# Patient Record
Sex: MALE | Race: BLACK OR AFRICAN AMERICAN
[De-identification: names, ages, dates, MRNs, and addresses within clinical notes are randomized per-mention and may not be internally consistent; named-entity substitution may affect disease eponyms.]

---

## 2018-09-20 ENCOUNTER — HOSPITAL ENCOUNTER (INPATIENT)
Dept: HOSPITAL 74 - JASU-ENDO | Age: 43
LOS: 6 days | Discharge: HOME | DRG: 330 | End: 2018-09-26
Attending: INTERNAL MEDICINE | Admitting: INTERNAL MEDICINE
Payer: COMMERCIAL

## 2018-09-20 VITALS — BODY MASS INDEX: 35.8 KG/M2

## 2018-09-20 DIAGNOSIS — K76.89: ICD-10-CM

## 2018-09-20 DIAGNOSIS — K42.0: ICD-10-CM

## 2018-09-20 DIAGNOSIS — K62.5: ICD-10-CM

## 2018-09-20 DIAGNOSIS — Z87.891: ICD-10-CM

## 2018-09-20 DIAGNOSIS — E66.9: ICD-10-CM

## 2018-09-20 DIAGNOSIS — F40.240: ICD-10-CM

## 2018-09-20 DIAGNOSIS — I10: ICD-10-CM

## 2018-09-20 DIAGNOSIS — K76.9: ICD-10-CM

## 2018-09-20 DIAGNOSIS — R91.1: ICD-10-CM

## 2018-09-20 DIAGNOSIS — C18.6: Primary | ICD-10-CM

## 2018-09-20 DIAGNOSIS — K57.40: ICD-10-CM

## 2018-09-20 LAB
ALBUMIN SERPL-MCNC: 4 G/DL (ref 3.4–5)
ALP SERPL-CCNC: 69 U/L (ref 45–117)
ALT SERPL-CCNC: 130 U/L (ref 13–61)
ANION GAP SERPL CALC-SCNC: 8 MMOL/L (ref 8–16)
APTT BLD: 32.1 SECONDS (ref 25.2–36.5)
AST SERPL-CCNC: 52 U/L (ref 15–37)
BASOPHILS # BLD: 0.7 % (ref 0–2)
BASOPHILS # BLD: 0.7 % (ref 0–2)
BILIRUB SERPL-MCNC: 0.8 MG/DL (ref 0.2–1)
BUN SERPL-MCNC: 9 MG/DL (ref 7–18)
CALCIUM SERPL-MCNC: 9.7 MG/DL (ref 8.5–10.1)
CHLORIDE SERPL-SCNC: 102 MMOL/L (ref 98–107)
CO2 SERPL-SCNC: 31 MMOL/L (ref 21–32)
CREAT SERPL-MCNC: 0.9 MG/DL (ref 0.55–1.3)
DEPRECATED RDW RBC AUTO: 14.8 % (ref 11.9–15.9)
DEPRECATED RDW RBC AUTO: 15 % (ref 11.9–15.9)
EOSINOPHIL # BLD: 4.9 % (ref 0–4.5)
EOSINOPHIL # BLD: 5.4 % (ref 0–4.5)
GLUCOSE SERPL-MCNC: 92 MG/DL (ref 74–106)
HCT VFR BLD CALC: 45.6 % (ref 35.4–49)
HCT VFR BLD CALC: 46.8 % (ref 35.4–49)
HGB BLD-MCNC: 15.4 GM/DL (ref 11.7–16.9)
HGB BLD-MCNC: 16 GM/DL (ref 11.7–16.9)
INR BLD: 1.09 (ref 0.83–1.09)
LYMPHOCYTES # BLD: 31.7 % (ref 8–40)
LYMPHOCYTES # BLD: 33.9 % (ref 8–40)
MAGNESIUM SERPL-MCNC: 2.2 MG/DL (ref 1.8–2.4)
MCH RBC QN AUTO: 30.5 PG (ref 25.7–33.7)
MCH RBC QN AUTO: 30.5 PG (ref 25.7–33.7)
MCHC RBC AUTO-ENTMCNC: 33.7 G/DL (ref 32–35.9)
MCHC RBC AUTO-ENTMCNC: 34.1 G/DL (ref 32–35.9)
MCV RBC: 89.5 FL (ref 80–96)
MCV RBC: 90.4 FL (ref 80–96)
MONOCYTES # BLD AUTO: 10.5 % (ref 3.8–10.2)
MONOCYTES # BLD AUTO: 8.8 % (ref 3.8–10.2)
NEUTROPHILS # BLD: 49.5 % (ref 42.8–82.8)
NEUTROPHILS # BLD: 53.9 % (ref 42.8–82.8)
PHOSPHATE SERPL-MCNC: 4 MG/DL (ref 2.5–4.9)
PLATELET # BLD AUTO: 243 K/MM3 (ref 134–434)
PLATELET # BLD AUTO: 249 K/MM3 (ref 134–434)
PMV BLD: 8.5 FL (ref 7.5–11.1)
PMV BLD: 9.1 FL (ref 7.5–11.1)
POTASSIUM SERPLBLD-SCNC: 3.9 MMOL/L (ref 3.5–5.1)
PROT SERPL-MCNC: 8 G/DL (ref 6.4–8.2)
PT PNL PPP: 12.3 SEC (ref 9.7–13)
RBC # BLD AUTO: 5.04 M/MM3 (ref 4–5.6)
RBC # BLD AUTO: 5.23 M/MM3 (ref 4–5.6)
SODIUM SERPL-SCNC: 141 MMOL/L (ref 136–145)
WBC # BLD AUTO: 6.5 K/MM3 (ref 4–10)
WBC # BLD AUTO: 7.4 K/MM3 (ref 4–10)

## 2018-09-20 PROCEDURE — 0DBN8ZX EXCISION OF SIGMOID COLON, VIA NATURAL OR ARTIFICIAL OPENING ENDOSCOPIC, DIAGNOSTIC: ICD-10-PCS | Performed by: INTERNAL MEDICINE

## 2018-09-20 PROCEDURE — 0DBC8ZX EXCISION OF ILEOCECAL VALVE, VIA NATURAL OR ARTIFICIAL OPENING ENDOSCOPIC, DIAGNOSTIC: ICD-10-PCS | Performed by: INTERNAL MEDICINE

## 2018-09-20 PROCEDURE — 0DBM8ZX EXCISION OF DESCENDING COLON, VIA NATURAL OR ARTIFICIAL OPENING ENDOSCOPIC, DIAGNOSTIC: ICD-10-PCS | Performed by: INTERNAL MEDICINE

## 2018-09-20 RX ADMIN — HEPARIN SODIUM SCH: 5000 INJECTION, SOLUTION INTRAVENOUS; SUBCUTANEOUS at 21:49

## 2018-09-21 LAB
ALBUMIN SERPL-MCNC: 3.1 G/DL (ref 3.4–5)
ALBUMIN SERPL-MCNC: 3.5 G/DL (ref 3.4–5)
ALP SERPL-CCNC: 55 U/L (ref 45–117)
ALP SERPL-CCNC: 61 U/L (ref 45–117)
ALT SERPL-CCNC: 113 U/L (ref 13–61)
ALT SERPL-CCNC: 116 U/L (ref 13–61)
ANION GAP SERPL CALC-SCNC: 7 MMOL/L (ref 8–16)
ANION GAP SERPL CALC-SCNC: 9 MMOL/L (ref 8–16)
AST SERPL-CCNC: 52 U/L (ref 15–37)
AST SERPL-CCNC: 52 U/L (ref 15–37)
BASOPHILS # BLD: 0.1 % (ref 0–2)
BASOPHILS # BLD: 0.6 % (ref 0–2)
BILIRUB CONJ SERPL-MCNC: 0.2 MG/DL (ref 0–0.2)
BILIRUB SERPL-MCNC: 0.9 MG/DL (ref 0.2–1)
BILIRUB SERPL-MCNC: 1.1 MG/DL (ref 0.2–1)
BUN SERPL-MCNC: 13 MG/DL (ref 7–18)
BUN SERPL-MCNC: 9 MG/DL (ref 7–18)
CALCIUM SERPL-MCNC: 8.7 MG/DL (ref 8.5–10.1)
CALCIUM SERPL-MCNC: 9.3 MG/DL (ref 8.5–10.1)
CHLORIDE SERPL-SCNC: 105 MMOL/L (ref 98–107)
CHLORIDE SERPL-SCNC: 105 MMOL/L (ref 98–107)
CO2 SERPL-SCNC: 29 MMOL/L (ref 21–32)
CO2 SERPL-SCNC: 29 MMOL/L (ref 21–32)
CREAT SERPL-MCNC: 0.9 MG/DL (ref 0.55–1.3)
CREAT SERPL-MCNC: 1.4 MG/DL (ref 0.55–1.3)
DEPRECATED RDW RBC AUTO: 14.5 % (ref 11.9–15.9)
DEPRECATED RDW RBC AUTO: 14.9 % (ref 11.9–15.9)
EOSINOPHIL # BLD: 0.1 % (ref 0–4.5)
EOSINOPHIL # BLD: 6.2 % (ref 0–4.5)
GLUCOSE SERPL-MCNC: 119 MG/DL (ref 74–106)
GLUCOSE SERPL-MCNC: 86 MG/DL (ref 74–106)
HCT VFR BLD CALC: 42.6 % (ref 35.4–49)
HCT VFR BLD CALC: 46.8 % (ref 35.4–49)
HGB BLD-MCNC: 14.5 GM/DL (ref 11.7–16.9)
HGB BLD-MCNC: 15.8 GM/DL (ref 11.7–16.9)
INR BLD: 1.16 (ref 0.83–1.09)
LYMPHOCYTES # BLD: 31.3 % (ref 8–40)
LYMPHOCYTES # BLD: 5.7 % (ref 8–40)
MCH RBC QN AUTO: 30.3 PG (ref 25.7–33.7)
MCH RBC QN AUTO: 30.6 PG (ref 25.7–33.7)
MCHC RBC AUTO-ENTMCNC: 33.8 G/DL (ref 32–35.9)
MCHC RBC AUTO-ENTMCNC: 34 G/DL (ref 32–35.9)
MCV RBC: 89.1 FL (ref 80–96)
MCV RBC: 90.5 FL (ref 80–96)
MONOCYTES # BLD AUTO: 11.3 % (ref 3.8–10.2)
MONOCYTES # BLD AUTO: 6 % (ref 3.8–10.2)
NEUTROPHILS # BLD: 50.6 % (ref 42.8–82.8)
NEUTROPHILS # BLD: 88.1 % (ref 42.8–82.8)
PLATELET # BLD AUTO: 207 K/MM3 (ref 134–434)
PLATELET # BLD AUTO: 237 K/MM3 (ref 134–434)
PMV BLD: 8.5 FL (ref 7.5–11.1)
PMV BLD: 9.2 FL (ref 7.5–11.1)
POTASSIUM SERPLBLD-SCNC: 3.8 MMOL/L (ref 3.5–5.1)
POTASSIUM SERPLBLD-SCNC: 4.7 MMOL/L (ref 3.5–5.1)
PROT SERPL-MCNC: 6.5 G/DL (ref 6.4–8.2)
PROT SERPL-MCNC: 7.1 G/DL (ref 6.4–8.2)
PT PNL PPP: 13.1 SEC (ref 9.7–13)
RBC # BLD AUTO: 4.78 M/MM3 (ref 4–5.6)
RBC # BLD AUTO: 5.18 M/MM3 (ref 4–5.6)
SODIUM SERPL-SCNC: 141 MMOL/L (ref 136–145)
SODIUM SERPL-SCNC: 143 MMOL/L (ref 136–145)
WBC # BLD AUTO: 10.8 K/MM3 (ref 4–10)
WBC # BLD AUTO: 6.5 K/MM3 (ref 4–10)

## 2018-09-21 PROCEDURE — 0DTG0ZZ RESECTION OF LEFT LARGE INTESTINE, OPEN APPROACH: ICD-10-PCS

## 2018-09-21 PROCEDURE — 0WQF0ZZ REPAIR ABDOMINAL WALL, OPEN APPROACH: ICD-10-PCS

## 2018-09-21 RX ADMIN — HEPARIN SODIUM SCH: 5000 INJECTION, SOLUTION INTRAVENOUS; SUBCUTANEOUS at 06:41

## 2018-09-21 RX ADMIN — MUPIROCIN SCH APPLIC: 20 OINTMENT TOPICAL at 22:46

## 2018-09-21 RX ADMIN — CHLORHEXIDINE GLUCONATE SCH APPLIC: 213 SOLUTION TOPICAL at 22:44

## 2018-09-21 RX ADMIN — NEOMYCIN SULFATE SCH MG: 500 TABLET ORAL at 10:29

## 2018-09-21 RX ADMIN — HYDROMORPHONE HYDROCHLORIDE SCH MG: 10 INJECTION, SOLUTION INTRAMUSCULAR; INTRAVENOUS; SUBCUTANEOUS at 19:20

## 2018-09-21 RX ADMIN — NEOMYCIN SULFATE SCH MG: 500 TABLET ORAL at 06:40

## 2018-09-21 RX ADMIN — NEOMYCIN SULFATE SCH MG: 500 TABLET ORAL at 05:46

## 2018-09-21 RX ADMIN — HEPARIN SODIUM SCH UNIT: 5000 INJECTION, SOLUTION INTRAVENOUS; SUBCUTANEOUS at 22:43

## 2018-09-22 LAB
ALBUMIN SERPL-MCNC: 3.3 G/DL (ref 3.4–5)
ALP SERPL-CCNC: 53 U/L (ref 45–117)
ALT SERPL-CCNC: 101 U/L (ref 13–61)
ANION GAP SERPL CALC-SCNC: 8 MMOL/L (ref 8–16)
AST SERPL-CCNC: 45 U/L (ref 15–37)
BILIRUB SERPL-MCNC: 1.3 MG/DL (ref 0.2–1)
BUN SERPL-MCNC: 16 MG/DL (ref 7–18)
CALCIUM SERPL-MCNC: 8.9 MG/DL (ref 8.5–10.1)
CHLORIDE SERPL-SCNC: 102 MMOL/L (ref 98–107)
CO2 SERPL-SCNC: 29 MMOL/L (ref 21–32)
CREAT SERPL-MCNC: 1.1 MG/DL (ref 0.55–1.3)
DEPRECATED RDW RBC AUTO: 14.6 % (ref 11.9–15.9)
GLUCOSE SERPL-MCNC: 133 MG/DL (ref 74–106)
HCT VFR BLD CALC: 44.4 % (ref 35.4–49)
HGB BLD-MCNC: 14.8 GM/DL (ref 11.7–16.9)
MAGNESIUM SERPL-MCNC: 2.2 MG/DL (ref 1.8–2.4)
MCH RBC QN AUTO: 29.9 PG (ref 25.7–33.7)
MCHC RBC AUTO-ENTMCNC: 33.3 G/DL (ref 32–35.9)
MCV RBC: 89.7 FL (ref 80–96)
PHOSPHATE SERPL-MCNC: 4.5 MG/DL (ref 2.5–4.9)
PLATELET # BLD AUTO: 222 K/MM3 (ref 134–434)
PMV BLD: 9 FL (ref 7.5–11.1)
POTASSIUM SERPLBLD-SCNC: 4.8 MMOL/L (ref 3.5–5.1)
PROT SERPL-MCNC: 6.4 G/DL (ref 6.4–8.2)
RBC # BLD AUTO: 4.94 M/MM3 (ref 4–5.6)
SODIUM SERPL-SCNC: 139 MMOL/L (ref 136–145)
WBC # BLD AUTO: 12.7 K/MM3 (ref 4–10)

## 2018-09-22 RX ADMIN — PANTOPRAZOLE SODIUM SCH MG: 40 INJECTION, POWDER, FOR SOLUTION INTRAVENOUS at 10:19

## 2018-09-22 RX ADMIN — HEPARIN SODIUM SCH UNIT: 5000 INJECTION, SOLUTION INTRAVENOUS; SUBCUTANEOUS at 23:00

## 2018-09-22 RX ADMIN — HYDROMORPHONE HYDROCHLORIDE SCH MG: 10 INJECTION, SOLUTION INTRAMUSCULAR; INTRAVENOUS; SUBCUTANEOUS at 12:08

## 2018-09-22 RX ADMIN — CHLORHEXIDINE GLUCONATE SCH APPLIC: 213 SOLUTION TOPICAL at 22:30

## 2018-09-22 RX ADMIN — HYDROMORPHONE HYDROCHLORIDE SCH MG: 10 INJECTION, SOLUTION INTRAMUSCULAR; INTRAVENOUS; SUBCUTANEOUS at 22:00

## 2018-09-22 RX ADMIN — HEPARIN SODIUM SCH UNIT: 5000 INJECTION, SOLUTION INTRAVENOUS; SUBCUTANEOUS at 06:27

## 2018-09-22 RX ADMIN — MUPIROCIN SCH APPLIC: 20 OINTMENT TOPICAL at 11:16

## 2018-09-22 RX ADMIN — HEPARIN SODIUM SCH UNIT: 5000 INJECTION, SOLUTION INTRAVENOUS; SUBCUTANEOUS at 14:41

## 2018-09-23 LAB
ALBUMIN SERPL-MCNC: 2.9 G/DL (ref 3.4–5)
ALP SERPL-CCNC: 50 U/L (ref 45–117)
ALT SERPL-CCNC: 71 U/L (ref 13–61)
ANION GAP SERPL CALC-SCNC: 3 MMOL/L (ref 8–16)
AST SERPL-CCNC: 40 U/L (ref 15–37)
BILIRUB SERPL-MCNC: 1 MG/DL (ref 0.2–1)
BUN SERPL-MCNC: 16 MG/DL (ref 7–18)
CALCIUM SERPL-MCNC: 8.3 MG/DL (ref 8.5–10.1)
CHLORIDE SERPL-SCNC: 102 MMOL/L (ref 98–107)
CO2 SERPL-SCNC: 33 MMOL/L (ref 21–32)
CREAT SERPL-MCNC: 1.2 MG/DL (ref 0.55–1.3)
DEPRECATED RDW RBC AUTO: 14.7 % (ref 11.9–15.9)
GLUCOSE SERPL-MCNC: 76 MG/DL (ref 74–106)
HCT VFR BLD CALC: 39 % (ref 35.4–49)
HGB BLD-MCNC: 13.5 GM/DL (ref 11.7–16.9)
MAGNESIUM SERPL-MCNC: 2.1 MG/DL (ref 1.8–2.4)
MCH RBC QN AUTO: 31.3 PG (ref 25.7–33.7)
MCHC RBC AUTO-ENTMCNC: 34.6 G/DL (ref 32–35.9)
MCV RBC: 90.3 FL (ref 80–96)
PHOSPHATE SERPL-MCNC: 2.8 MG/DL (ref 2.5–4.9)
PLATELET # BLD AUTO: 194 K/MM3 (ref 134–434)
PMV BLD: 8.6 FL (ref 7.5–11.1)
POTASSIUM SERPLBLD-SCNC: 4.2 MMOL/L (ref 3.5–5.1)
PROT SERPL-MCNC: 6.3 G/DL (ref 6.4–8.2)
RBC # BLD AUTO: 4.32 M/MM3 (ref 4–5.6)
SODIUM SERPL-SCNC: 139 MMOL/L (ref 136–145)
WBC # BLD AUTO: 10.6 K/MM3 (ref 4–10)

## 2018-09-23 RX ADMIN — PANTOPRAZOLE SODIUM SCH MG: 40 INJECTION, POWDER, FOR SOLUTION INTRAVENOUS at 09:43

## 2018-09-23 RX ADMIN — MUPIROCIN SCH APPLIC: 20 OINTMENT TOPICAL at 09:43

## 2018-09-23 RX ADMIN — MORPHINE SULFATE PRN MG: 4 INJECTION, SOLUTION INTRAMUSCULAR; INTRAVENOUS at 20:02

## 2018-09-23 RX ADMIN — HEPARIN SODIUM SCH UNIT: 5000 INJECTION, SOLUTION INTRAVENOUS; SUBCUTANEOUS at 13:50

## 2018-09-23 RX ADMIN — MORPHINE SULFATE PRN MG: 4 INJECTION, SOLUTION INTRAMUSCULAR; INTRAVENOUS at 17:01

## 2018-09-23 RX ADMIN — HEPARIN SODIUM SCH UNIT: 5000 INJECTION, SOLUTION INTRAVENOUS; SUBCUTANEOUS at 05:55

## 2018-09-24 LAB
ALBUMIN SERPL-MCNC: 2.8 G/DL (ref 3.4–5)
ALP SERPL-CCNC: 46 U/L (ref 45–117)
ALT SERPL-CCNC: 57 U/L (ref 13–61)
ANION GAP SERPL CALC-SCNC: 7 MMOL/L (ref 8–16)
AST SERPL-CCNC: 31 U/L (ref 15–37)
BASOPHILS # BLD: 0.5 % (ref 0–2)
BILIRUB SERPL-MCNC: 1.2 MG/DL (ref 0.2–1)
BUN SERPL-MCNC: 15 MG/DL (ref 7–18)
CALCIUM SERPL-MCNC: 8.3 MG/DL (ref 8.5–10.1)
CHLORIDE SERPL-SCNC: 102 MMOL/L (ref 98–107)
CO2 SERPL-SCNC: 29 MMOL/L (ref 21–32)
CREAT SERPL-MCNC: 1.1 MG/DL (ref 0.55–1.3)
DEPRECATED RDW RBC AUTO: 14.3 % (ref 11.9–15.9)
EOSINOPHIL # BLD: 2.6 % (ref 0–4.5)
GLUCOSE SERPL-MCNC: 92 MG/DL (ref 74–106)
HCT VFR BLD CALC: 35.2 % (ref 35.4–49)
HGB BLD-MCNC: 12.2 GM/DL (ref 11.7–16.9)
LYMPHOCYTES # BLD: 18.9 % (ref 8–40)
MAGNESIUM SERPL-MCNC: 1.9 MG/DL (ref 1.8–2.4)
MCH RBC QN AUTO: 30.9 PG (ref 25.7–33.7)
MCHC RBC AUTO-ENTMCNC: 34.7 G/DL (ref 32–35.9)
MCV RBC: 89 FL (ref 80–96)
MONOCYTES # BLD AUTO: 12.6 % (ref 3.8–10.2)
NEUTROPHILS # BLD: 65.4 % (ref 42.8–82.8)
PHOSPHATE SERPL-MCNC: 4.2 MG/DL (ref 2.5–4.9)
PLATELET # BLD AUTO: 200 K/MM3 (ref 134–434)
PMV BLD: 8.2 FL (ref 7.5–11.1)
POTASSIUM SERPLBLD-SCNC: 4 MMOL/L (ref 3.5–5.1)
PROT SERPL-MCNC: 6.2 G/DL (ref 6.4–8.2)
RBC # BLD AUTO: 3.96 M/MM3 (ref 4–5.6)
SODIUM SERPL-SCNC: 138 MMOL/L (ref 136–145)
WBC # BLD AUTO: 10.7 K/MM3 (ref 4–10)

## 2018-09-24 RX ADMIN — MUPIROCIN SCH APPLIC: 20 OINTMENT TOPICAL at 09:36

## 2018-09-24 RX ADMIN — HEPARIN SODIUM SCH UNIT: 5000 INJECTION, SOLUTION INTRAVENOUS; SUBCUTANEOUS at 13:05

## 2018-09-24 RX ADMIN — MUPIROCIN SCH: 20 OINTMENT TOPICAL at 00:00

## 2018-09-24 RX ADMIN — HEPARIN SODIUM SCH UNIT: 5000 INJECTION, SOLUTION INTRAVENOUS; SUBCUTANEOUS at 21:11

## 2018-09-24 RX ADMIN — PANTOPRAZOLE SODIUM SCH MG: 40 INJECTION, POWDER, FOR SOLUTION INTRAVENOUS at 09:34

## 2018-09-24 RX ADMIN — HEPARIN SODIUM SCH UNIT: 5000 INJECTION, SOLUTION INTRAVENOUS; SUBCUTANEOUS at 06:07

## 2018-09-24 RX ADMIN — MORPHINE SULFATE PRN MG: 4 INJECTION, SOLUTION INTRAMUSCULAR; INTRAVENOUS at 15:23

## 2018-09-24 RX ADMIN — HEPARIN SODIUM SCH: 5000 INJECTION, SOLUTION INTRAVENOUS; SUBCUTANEOUS at 00:00

## 2018-09-24 RX ADMIN — MUPIROCIN SCH: 20 OINTMENT TOPICAL at 18:54

## 2018-09-24 RX ADMIN — HEPARIN SODIUM SCH: 5000 INJECTION, SOLUTION INTRAVENOUS; SUBCUTANEOUS at 18:53

## 2018-09-25 LAB
ANION GAP SERPL CALC-SCNC: 10 MMOL/L (ref 8–16)
BUN SERPL-MCNC: 15 MG/DL (ref 7–18)
CALCIUM SERPL-MCNC: 8.3 MG/DL (ref 8.5–10.1)
CHLORIDE SERPL-SCNC: 105 MMOL/L (ref 98–107)
CO2 SERPL-SCNC: 29 MMOL/L (ref 21–32)
CREAT SERPL-MCNC: 1.1 MG/DL (ref 0.55–1.3)
DEPRECATED RDW RBC AUTO: 14.7 % (ref 11.9–15.9)
GLUCOSE SERPL-MCNC: 88 MG/DL (ref 74–106)
HCT VFR BLD CALC: 30.8 % (ref 35.4–49)
HGB BLD-MCNC: 10.6 GM/DL (ref 11.7–16.9)
MAGNESIUM SERPL-MCNC: 2.1 MG/DL (ref 1.8–2.4)
MCH RBC QN AUTO: 30.5 PG (ref 25.7–33.7)
MCHC RBC AUTO-ENTMCNC: 34.4 G/DL (ref 32–35.9)
MCV RBC: 88.6 FL (ref 80–96)
PHOSPHATE SERPL-MCNC: 3.7 MG/DL (ref 2.5–4.9)
PLATELET # BLD AUTO: 224 K/MM3 (ref 134–434)
PMV BLD: 8.1 FL (ref 7.5–11.1)
POTASSIUM SERPLBLD-SCNC: 3.9 MMOL/L (ref 3.5–5.1)
RBC # BLD AUTO: 3.47 M/MM3 (ref 4–5.6)
SODIUM SERPL-SCNC: 143 MMOL/L (ref 136–145)
WBC # BLD AUTO: 11.2 K/MM3 (ref 4–10)

## 2018-09-25 RX ADMIN — HEPARIN SODIUM SCH UNIT: 5000 INJECTION, SOLUTION INTRAVENOUS; SUBCUTANEOUS at 06:01

## 2018-09-25 RX ADMIN — HEPARIN SODIUM SCH UNIT: 5000 INJECTION, SOLUTION INTRAVENOUS; SUBCUTANEOUS at 21:11

## 2018-09-25 RX ADMIN — PANTOPRAZOLE SODIUM SCH MG: 40 INJECTION, POWDER, FOR SOLUTION INTRAVENOUS at 11:02

## 2018-09-25 RX ADMIN — HEPARIN SODIUM SCH UNIT: 5000 INJECTION, SOLUTION INTRAVENOUS; SUBCUTANEOUS at 14:31

## 2018-09-26 VITALS — DIASTOLIC BLOOD PRESSURE: 77 MMHG | SYSTOLIC BLOOD PRESSURE: 130 MMHG | HEART RATE: 93 BPM | TEMPERATURE: 98.5 F

## 2018-09-26 LAB
BASOPHILS # BLD: 1 % (ref 0–2)
DEPRECATED RDW RBC AUTO: 14.5 % (ref 11.9–15.9)
EOSINOPHIL # BLD: 7.4 % (ref 0–4.5)
HCT VFR BLD CALC: 31.2 % (ref 35.4–49)
HGB BLD-MCNC: 10.6 GM/DL (ref 11.7–16.9)
LYMPHOCYTES # BLD: 20.7 % (ref 8–40)
MCH RBC QN AUTO: 30.7 PG (ref 25.7–33.7)
MCHC RBC AUTO-ENTMCNC: 34 G/DL (ref 32–35.9)
MCV RBC: 90.1 FL (ref 80–96)
MONOCYTES # BLD AUTO: 11.5 % (ref 3.8–10.2)
NEUTROPHILS # BLD: 59.4 % (ref 42.8–82.8)
PLATELET # BLD AUTO: 256 K/MM3 (ref 134–434)
PMV BLD: 8.2 FL (ref 7.5–11.1)
RBC # BLD AUTO: 3.46 M/MM3 (ref 4–5.6)
WBC # BLD AUTO: 10.2 K/MM3 (ref 4–10)

## 2018-09-26 RX ADMIN — HEPARIN SODIUM SCH UNIT: 5000 INJECTION, SOLUTION INTRAVENOUS; SUBCUTANEOUS at 06:22

## 2018-09-26 RX ADMIN — PANTOPRAZOLE SODIUM SCH MG: 40 INJECTION, POWDER, FOR SOLUTION INTRAVENOUS at 11:19

## 2018-10-11 ENCOUNTER — HOSPITAL ENCOUNTER (EMERGENCY)
Dept: HOSPITAL 74 - JERFT | Age: 43
Discharge: HOME | End: 2018-10-11
Payer: COMMERCIAL

## 2018-10-11 VITALS — SYSTOLIC BLOOD PRESSURE: 125 MMHG | HEART RATE: 84 BPM | DIASTOLIC BLOOD PRESSURE: 78 MMHG | TEMPERATURE: 99.3 F

## 2018-10-11 VITALS — BODY MASS INDEX: 28.7 KG/M2

## 2018-10-11 DIAGNOSIS — Z48.01: Primary | ICD-10-CM

## 2019-02-15 ENCOUNTER — HOSPITAL ENCOUNTER (OUTPATIENT)
Dept: HOSPITAL 74 - JONCCHEMO | Age: 44
Discharge: HOME | End: 2019-02-15
Attending: INTERNAL MEDICINE
Payer: COMMERCIAL

## 2019-02-15 VITALS — TEMPERATURE: 98.4 F

## 2019-02-15 VITALS — DIASTOLIC BLOOD PRESSURE: 95 MMHG | SYSTOLIC BLOOD PRESSURE: 145 MMHG | HEART RATE: 90 BPM

## 2019-02-15 DIAGNOSIS — C18.9: ICD-10-CM

## 2019-02-15 DIAGNOSIS — Z51.11: Primary | ICD-10-CM

## 2019-02-15 LAB
ALBUMIN SERPL-MCNC: 3.8 G/DL (ref 3.4–5)
ALBUMIN SERPL-MCNC: 3.8 G/DL (ref 3.4–5)
ALP SERPL-CCNC: 60 U/L (ref 45–117)
ALP SERPL-CCNC: 61 U/L (ref 45–117)
ALT SERPL-CCNC: 75 U/L (ref 13–61)
ALT SERPL-CCNC: 78 U/L (ref 13–61)
ANION GAP SERPL CALC-SCNC: 5 MMOL/L (ref 8–16)
AST SERPL-CCNC: 36 U/L (ref 15–37)
AST SERPL-CCNC: 37 U/L (ref 15–37)
BASOPHILS # BLD: 0.5 % (ref 0–2)
BILIRUB CONJ SERPL-MCNC: 0.1 MG/DL (ref 0–0.2)
BILIRUB SERPL-MCNC: 0.5 MG/DL (ref 0.2–1)
BILIRUB SERPL-MCNC: 0.5 MG/DL (ref 0.2–1)
BUN SERPL-MCNC: 12 MG/DL (ref 7–18)
CALCIUM SERPL-MCNC: 9 MG/DL (ref 8.5–10.1)
CHLORIDE SERPL-SCNC: 106 MMOL/L (ref 98–107)
CO2 SERPL-SCNC: 29 MMOL/L (ref 21–32)
CREAT SERPL-MCNC: 0.9 MG/DL (ref 0.55–1.3)
DEPRECATED RDW RBC AUTO: 16.3 % (ref 11.9–15.9)
EOSINOPHIL # BLD: 3.9 % (ref 0–4.5)
GLUCOSE SERPL-MCNC: 105 MG/DL (ref 74–106)
HCT VFR BLD CALC: 46.4 % (ref 35.4–49)
HGB BLD-MCNC: 15.9 GM/DL (ref 11.7–16.9)
LYMPHOCYTES # BLD: 32.7 % (ref 8–40)
MAGNESIUM SERPL-MCNC: 2.3 MG/DL (ref 1.8–2.4)
MCH RBC QN AUTO: 29.4 PG (ref 25.7–33.7)
MCHC RBC AUTO-ENTMCNC: 34.2 G/DL (ref 32–35.9)
MCV RBC: 85.9 FL (ref 80–96)
MONOCYTES # BLD AUTO: 10.2 % (ref 3.8–10.2)
NEUTROPHILS # BLD: 52.7 % (ref 42.8–82.8)
PLATELET # BLD AUTO: 276 K/MM3 (ref 134–434)
PMV BLD: 8.4 FL (ref 7.5–11.1)
POTASSIUM SERPLBLD-SCNC: 3.8 MMOL/L (ref 3.5–5.1)
PROT SERPL-MCNC: 7.8 G/DL (ref 6.4–8.2)
PROT SERPL-MCNC: 7.9 G/DL (ref 6.4–8.2)
RBC # BLD AUTO: 5.41 M/MM3 (ref 4–5.6)
SODIUM SERPL-SCNC: 140 MMOL/L (ref 136–145)
WBC # BLD AUTO: 5 K/MM3 (ref 4–10)

## 2019-03-08 ENCOUNTER — HOSPITAL ENCOUNTER (OUTPATIENT)
Dept: HOSPITAL 74 - JONCCHEMO | Age: 44
Discharge: HOME | End: 2019-03-08
Attending: INTERNAL MEDICINE
Payer: COMMERCIAL

## 2019-03-08 VITALS — DIASTOLIC BLOOD PRESSURE: 95 MMHG | HEART RATE: 84 BPM | SYSTOLIC BLOOD PRESSURE: 146 MMHG | TEMPERATURE: 97.7 F

## 2019-03-08 DIAGNOSIS — Z76.89: ICD-10-CM

## 2019-03-08 DIAGNOSIS — C18.9: Primary | ICD-10-CM

## 2019-03-08 LAB
ALBUMIN SERPL-MCNC: 3.7 G/DL (ref 3.4–5)
ALP SERPL-CCNC: 63 U/L (ref 45–117)
ALT SERPL-CCNC: 89 U/L (ref 13–61)
ANION GAP SERPL CALC-SCNC: 4 MMOL/L (ref 8–16)
AST SERPL-CCNC: 13 U/L (ref 15–37)
BASOPHILS # BLD: 1 % (ref 0–2)
BILIRUB CONJ SERPL-MCNC: 0.1 MG/DL (ref 0–0.2)
BILIRUB SERPL-MCNC: 0.6 MG/DL (ref 0.2–1)
BUN SERPL-MCNC: 12 MG/DL (ref 7–18)
CALCIUM SERPL-MCNC: 8.7 MG/DL (ref 8.5–10.1)
CHLORIDE SERPL-SCNC: 107 MMOL/L (ref 98–107)
CO2 SERPL-SCNC: 28 MMOL/L (ref 21–32)
CREAT SERPL-MCNC: 1.1 MG/DL (ref 0.55–1.3)
DEPRECATED RDW RBC AUTO: 17.1 % (ref 11.9–15.9)
EOSINOPHIL # BLD: 2.7 % (ref 0–4.5)
GLUCOSE SERPL-MCNC: 95 MG/DL (ref 74–106)
HCT VFR BLD CALC: 44 % (ref 35.4–49)
HGB BLD-MCNC: 15.2 GM/DL (ref 11.7–16.9)
LYMPHOCYTES # BLD: 36 % (ref 8–40)
MAGNESIUM SERPL-MCNC: 2.3 MG/DL (ref 1.8–2.4)
MCH RBC QN AUTO: 30 PG (ref 25.7–33.7)
MCHC RBC AUTO-ENTMCNC: 34.5 G/DL (ref 32–35.9)
MCV RBC: 86.7 FL (ref 80–96)
MONOCYTES # BLD AUTO: 14.1 % (ref 3.8–10.2)
NEUTROPHILS # BLD: 46.2 % (ref 42.8–82.8)
PLATELET # BLD AUTO: 260 K/MM3 (ref 134–434)
PMV BLD: 8.1 FL (ref 7.5–11.1)
POTASSIUM SERPLBLD-SCNC: 3.8 MMOL/L (ref 3.5–5.1)
PROT SERPL-MCNC: 7.6 G/DL (ref 6.4–8.2)
RBC # BLD AUTO: 5.07 M/MM3 (ref 4–5.6)
SODIUM SERPL-SCNC: 139 MMOL/L (ref 136–145)
WBC # BLD AUTO: 6 K/MM3 (ref 4–10)

## 2019-03-08 PROCEDURE — 3E033GC INTRODUCTION OF OTHER THERAPEUTIC SUBSTANCE INTO PERIPHERAL VEIN, PERCUTANEOUS APPROACH: ICD-10-PCS | Performed by: INTERNAL MEDICINE

## 2019-03-29 ENCOUNTER — HOSPITAL ENCOUNTER (OUTPATIENT)
Dept: HOSPITAL 74 - JONCCHEMO | Age: 44
Discharge: HOME | End: 2019-03-29
Attending: INTERNAL MEDICINE
Payer: COMMERCIAL

## 2019-03-29 VITALS — TEMPERATURE: 98.2 F

## 2019-03-29 VITALS — DIASTOLIC BLOOD PRESSURE: 85 MMHG | SYSTOLIC BLOOD PRESSURE: 130 MMHG | HEART RATE: 75 BPM

## 2019-03-29 DIAGNOSIS — Z51.11: Primary | ICD-10-CM

## 2019-03-29 DIAGNOSIS — C18.9: ICD-10-CM

## 2019-03-29 LAB
ALBUMIN SERPL-MCNC: 3.9 G/DL (ref 3.4–5)
ALBUMIN SERPL-MCNC: 4 G/DL (ref 3.4–5)
ALP SERPL-CCNC: 58 U/L (ref 45–117)
ALP SERPL-CCNC: 58 U/L (ref 45–117)
ALT SERPL-CCNC: 76 U/L (ref 13–61)
ALT SERPL-CCNC: 77 U/L (ref 13–61)
ANION GAP SERPL CALC-SCNC: 8 MMOL/L (ref 8–16)
AST SERPL-CCNC: 45 U/L (ref 15–37)
AST SERPL-CCNC: 46 U/L (ref 15–37)
BASOPHILS # BLD: 0.6 % (ref 0–2)
BILIRUB CONJ SERPL-MCNC: 0.2 MG/DL (ref 0–0.2)
BILIRUB SERPL-MCNC: 0.6 MG/DL (ref 0.2–1)
BILIRUB SERPL-MCNC: 0.6 MG/DL (ref 0.2–1)
BUN SERPL-MCNC: 13 MG/DL (ref 7–18)
CALCIUM SERPL-MCNC: 8.9 MG/DL (ref 8.5–10.1)
CHLORIDE SERPL-SCNC: 107 MMOL/L (ref 98–107)
CO2 SERPL-SCNC: 27 MMOL/L (ref 21–32)
CREAT SERPL-MCNC: 1 MG/DL (ref 0.55–1.3)
DEPRECATED RDW RBC AUTO: 20.3 % (ref 11.9–15.9)
EOSINOPHIL # BLD: 2.6 % (ref 0–4.5)
GLUCOSE SERPL-MCNC: 87 MG/DL (ref 74–106)
HCT VFR BLD CALC: 43.7 % (ref 35.4–49)
HGB BLD-MCNC: 14.4 GM/DL (ref 11.7–16.9)
LYMPHOCYTES # BLD: 32.2 % (ref 8–40)
MAGNESIUM SERPL-MCNC: 2.1 MG/DL (ref 1.8–2.4)
MCH RBC QN AUTO: 29 PG (ref 25.7–33.7)
MCHC RBC AUTO-ENTMCNC: 32.8 G/DL (ref 32–35.9)
MCV RBC: 88.4 FL (ref 80–96)
MONOCYTES # BLD AUTO: 16.5 % (ref 3.8–10.2)
NEUTROPHILS # BLD: 48.1 % (ref 42.8–82.8)
PLATELET # BLD AUTO: 239 K/MM3 (ref 134–434)
PMV BLD: 8.1 FL (ref 7.5–11.1)
POTASSIUM SERPLBLD-SCNC: 3.8 MMOL/L (ref 3.5–5.1)
PROT SERPL-MCNC: 7.4 G/DL (ref 6.4–8.2)
PROT SERPL-MCNC: 7.5 G/DL (ref 6.4–8.2)
RBC # BLD AUTO: 4.94 M/MM3 (ref 4–5.6)
SODIUM SERPL-SCNC: 143 MMOL/L (ref 136–145)
WBC # BLD AUTO: 5.9 K/MM3 (ref 4–10)

## 2019-03-31 ENCOUNTER — HOSPITAL ENCOUNTER (EMERGENCY)
Dept: HOSPITAL 74 - JER | Age: 44
LOS: 1 days | Discharge: HOME | End: 2019-04-01
Payer: COMMERCIAL

## 2019-03-31 VITALS — HEART RATE: 75 BPM | TEMPERATURE: 98.3 F | SYSTOLIC BLOOD PRESSURE: 145 MMHG | DIASTOLIC BLOOD PRESSURE: 84 MMHG

## 2019-03-31 VITALS — BODY MASS INDEX: 30.8 KG/M2

## 2019-03-31 DIAGNOSIS — Z85.038: ICD-10-CM

## 2019-03-31 DIAGNOSIS — M79.601: Primary | ICD-10-CM

## 2019-03-31 DIAGNOSIS — I10: ICD-10-CM

## 2019-03-31 LAB
ALBUMIN SERPL-MCNC: 3.5 G/DL (ref 3.4–5)
ALP SERPL-CCNC: 53 U/L (ref 45–117)
ALT SERPL-CCNC: 85 U/L (ref 13–61)
ANION GAP SERPL CALC-SCNC: 6 MMOL/L (ref 8–16)
ANISOCYTOSIS BLD QL: (no result)
AST SERPL-CCNC: 53 U/L (ref 15–37)
BASOPHILS # BLD: 0.8 % (ref 0–2)
BILIRUB SERPL-MCNC: 0.4 MG/DL (ref 0.2–1)
BUN SERPL-MCNC: 15 MG/DL (ref 7–18)
CALCIUM SERPL-MCNC: 8.4 MG/DL (ref 8.5–10.1)
CHLORIDE SERPL-SCNC: 108 MMOL/L (ref 98–107)
CO2 SERPL-SCNC: 26 MMOL/L (ref 21–32)
CREAT SERPL-MCNC: 0.9 MG/DL (ref 0.55–1.3)
DEPRECATED RDW RBC AUTO: 22.2 % (ref 11.9–15.9)
EOSINOPHIL # BLD: 1.7 % (ref 0–4.5)
GLUCOSE SERPL-MCNC: 145 MG/DL (ref 74–106)
HCT VFR BLD CALC: 41.8 % (ref 35.4–49)
HGB BLD-MCNC: 14.1 GM/DL (ref 11.7–16.9)
LYMPHOCYTES # BLD: 39.4 % (ref 8–40)
MACROCYTES BLD QL: (no result)
MCH RBC QN AUTO: 30.4 PG (ref 25.7–33.7)
MCHC RBC AUTO-ENTMCNC: 33.8 G/DL (ref 32–35.9)
MCV RBC: 90 FL (ref 80–96)
MONOCYTES # BLD AUTO: 14.6 % (ref 3.8–10.2)
NEUTROPHILS # BLD: 43.5 % (ref 42.8–82.8)
PLATELET # BLD AUTO: 226 K/MM3 (ref 134–434)
PLATELET BLD QL SMEAR: ADEQUATE
PMV BLD: 7.6 FL (ref 7.5–11.1)
POTASSIUM SERPLBLD-SCNC: 3.9 MMOL/L (ref 3.5–5.1)
PROT SERPL-MCNC: 6.9 G/DL (ref 6.4–8.2)
RBC # BLD AUTO: 4.64 M/MM3 (ref 4–5.6)
SODIUM SERPL-SCNC: 140 MMOL/L (ref 136–145)
WBC # BLD AUTO: 5.1 K/MM3 (ref 4–10)

## 2019-04-19 ENCOUNTER — HOSPITAL ENCOUNTER (OUTPATIENT)
Dept: HOSPITAL 74 - JONCCHEMO | Age: 44
Discharge: HOME | End: 2019-04-19
Attending: INTERNAL MEDICINE
Payer: COMMERCIAL

## 2019-04-19 VITALS — TEMPERATURE: 98.2 F | SYSTOLIC BLOOD PRESSURE: 137 MMHG | DIASTOLIC BLOOD PRESSURE: 81 MMHG | HEART RATE: 99 BPM

## 2019-04-19 DIAGNOSIS — C18.9: ICD-10-CM

## 2019-04-19 DIAGNOSIS — Z51.11: Primary | ICD-10-CM

## 2019-04-19 LAB
ALBUMIN SERPL-MCNC: 3.8 G/DL (ref 3.4–5)
ALP SERPL-CCNC: 58 U/L (ref 45–117)
ALT SERPL-CCNC: 140 U/L (ref 13–61)
ANION GAP SERPL CALC-SCNC: 6 MMOL/L (ref 8–16)
ANISOCYTOSIS BLD QL: (no result)
AST SERPL-CCNC: 62 U/L (ref 15–37)
BASOPHILS # BLD: 1 % (ref 0–2)
BILIRUB SERPL-MCNC: 0.5 MG/DL (ref 0.2–1)
BUN SERPL-MCNC: 12 MG/DL (ref 7–18)
CALCIUM SERPL-MCNC: 9.7 MG/DL (ref 8.5–10.1)
CHLORIDE SERPL-SCNC: 106 MMOL/L (ref 98–107)
CO2 SERPL-SCNC: 27 MMOL/L (ref 21–32)
CREAT SERPL-MCNC: 1 MG/DL (ref 0.55–1.3)
DEPRECATED RDW RBC AUTO: 21.4 % (ref 11.9–15.9)
EOSINOPHIL # BLD: 0 % (ref 0–4.5)
GLUCOSE SERPL-MCNC: 153 MG/DL (ref 74–106)
HCT VFR BLD CALC: 43.9 % (ref 35.4–49)
HGB BLD-MCNC: 14.7 GM/DL (ref 11.7–16.9)
LYMPHOCYTES # BLD: 13.6 % (ref 8–40)
MACROCYTES BLD QL: (no result)
MCH RBC QN AUTO: 30.5 PG (ref 25.7–33.7)
MCHC RBC AUTO-ENTMCNC: 33.5 G/DL (ref 32–35.9)
MCV RBC: 91.1 FL (ref 80–96)
MONOCYTES # BLD AUTO: 7.1 % (ref 3.8–10.2)
NEUTROPHILS # BLD: 78.3 % (ref 42.8–82.8)
PLATELET # BLD AUTO: 304 K/MM3 (ref 134–434)
PLATELET BLD QL SMEAR: NORMAL
PMV BLD: 8.2 FL (ref 7.5–11.1)
POTASSIUM SERPLBLD-SCNC: 4.1 MMOL/L (ref 3.5–5.1)
PROT SERPL-MCNC: 8 G/DL (ref 6.4–8.2)
RBC # BLD AUTO: 4.82 M/MM3 (ref 4–5.6)
SODIUM SERPL-SCNC: 139 MMOL/L (ref 136–145)
WBC # BLD AUTO: 12 K/MM3 (ref 4–10)

## 2019-05-10 ENCOUNTER — HOSPITAL ENCOUNTER (OUTPATIENT)
Dept: HOSPITAL 74 - JONCCHEMO | Age: 44
Discharge: HOME | End: 2019-05-10
Payer: COMMERCIAL

## 2019-05-31 ENCOUNTER — HOSPITAL ENCOUNTER (OUTPATIENT)
Dept: HOSPITAL 74 - JONCCHEMO | Age: 44
Discharge: HOME | End: 2019-05-31
Attending: INTERNAL MEDICINE
Payer: COMMERCIAL

## 2019-05-31 VITALS — SYSTOLIC BLOOD PRESSURE: 138 MMHG | DIASTOLIC BLOOD PRESSURE: 94 MMHG | HEART RATE: 90 BPM

## 2019-05-31 VITALS — TEMPERATURE: 97.7 F

## 2019-05-31 DIAGNOSIS — I10: ICD-10-CM

## 2019-05-31 DIAGNOSIS — E66.9: ICD-10-CM

## 2019-05-31 DIAGNOSIS — Z51.11: Primary | ICD-10-CM

## 2019-05-31 DIAGNOSIS — C18.9: ICD-10-CM

## 2019-05-31 LAB
ALBUMIN SERPL-MCNC: 3.8 G/DL (ref 3.4–5)
ALP SERPL-CCNC: 60 U/L (ref 45–117)
ALT SERPL-CCNC: 99 U/L (ref 13–61)
ANION GAP SERPL CALC-SCNC: 6 MMOL/L (ref 8–16)
AST SERPL-CCNC: 54 U/L (ref 15–37)
BASOPHILS # BLD: 1.4 % (ref 0–2)
BILIRUB CONJ SERPL-MCNC: 0.2 MG/DL (ref 0–0.2)
BILIRUB SERPL-MCNC: 0.5 MG/DL (ref 0.2–1)
BUN SERPL-MCNC: 7 MG/DL (ref 7–18)
CALCIUM SERPL-MCNC: 9 MG/DL (ref 8.5–10.1)
CHLORIDE SERPL-SCNC: 108 MMOL/L (ref 98–107)
CO2 SERPL-SCNC: 27 MMOL/L (ref 21–32)
CREAT SERPL-MCNC: 0.9 MG/DL (ref 0.55–1.3)
DEPRECATED RDW RBC AUTO: 18.9 % (ref 11.9–15.9)
EOSINOPHIL # BLD: 2.3 % (ref 0–4.5)
GLUCOSE SERPL-MCNC: 136 MG/DL (ref 74–106)
HCT VFR BLD CALC: 44.9 % (ref 35.4–49)
HGB BLD-MCNC: 15.1 GM/DL (ref 11.7–16.9)
LYMPHOCYTES # BLD: 33.5 % (ref 8–40)
MAGNESIUM SERPL-MCNC: 2.2 MG/DL (ref 1.8–2.4)
MCH RBC QN AUTO: 31.9 PG (ref 25.7–33.7)
MCHC RBC AUTO-ENTMCNC: 33.7 G/DL (ref 32–35.9)
MCV RBC: 94.6 FL (ref 80–96)
MONOCYTES # BLD AUTO: 14.4 % (ref 3.8–10.2)
NEUTROPHILS # BLD: 48.4 % (ref 42.8–82.8)
PLATELET # BLD AUTO: 242 K/MM3 (ref 134–434)
PMV BLD: 8.4 FL (ref 7.5–11.1)
POTASSIUM SERPLBLD-SCNC: 3.7 MMOL/L (ref 3.5–5.1)
PROT SERPL-MCNC: 7.5 G/DL (ref 6.4–8.2)
RBC # BLD AUTO: 4.75 M/MM3 (ref 4–5.6)
SODIUM SERPL-SCNC: 140 MMOL/L (ref 136–145)
WBC # BLD AUTO: 5.6 K/MM3 (ref 4–10)

## 2019-05-31 PROCEDURE — 3E03305 INTRODUCTION OF OTHER ANTINEOPLASTIC INTO PERIPHERAL VEIN, PERCUTANEOUS APPROACH: ICD-10-PCS | Performed by: INTERNAL MEDICINE

## 2019-05-31 PROCEDURE — 3E033GC INTRODUCTION OF OTHER THERAPEUTIC SUBSTANCE INTO PERIPHERAL VEIN, PERCUTANEOUS APPROACH: ICD-10-PCS | Performed by: INTERNAL MEDICINE

## 2019-08-15 ENCOUNTER — HOSPITAL ENCOUNTER (EMERGENCY)
Dept: HOSPITAL 74 - JERFT | Age: 44
Discharge: HOME | End: 2019-08-15
Payer: COMMERCIAL

## 2020-06-10 ENCOUNTER — HOSPITAL ENCOUNTER (OUTPATIENT)
Dept: HOSPITAL 74 - JONCCHEMO | Age: 45
Discharge: HOME | End: 2020-06-10
Attending: INTERNAL MEDICINE
Payer: COMMERCIAL

## 2020-06-10 VITALS — DIASTOLIC BLOOD PRESSURE: 89 MMHG | SYSTOLIC BLOOD PRESSURE: 135 MMHG | HEART RATE: 83 BPM

## 2020-06-10 VITALS — TEMPERATURE: 98.6 F

## 2020-06-10 DIAGNOSIS — C18.9: ICD-10-CM

## 2020-06-10 DIAGNOSIS — Z51.11: Primary | ICD-10-CM

## 2020-06-10 LAB
ALBUMIN SERPL-MCNC: 3.8 G/DL (ref 3.4–5)
ALP SERPL-CCNC: 59 U/L (ref 45–117)
ALT SERPL-CCNC: 210 U/L (ref 13–61)
ANION GAP SERPL CALC-SCNC: 7 MMOL/L (ref 8–16)
APPEARANCE UR: CLEAR
AST SERPL-CCNC: 106 U/L (ref 15–37)
BASOPHILS # BLD: 1.5 % (ref 0–2)
BILIRUB SERPL-MCNC: 0.5 MG/DL (ref 0.2–1)
BILIRUB UR STRIP.AUTO-MCNC: NEGATIVE MG/DL
BUN SERPL-MCNC: 11.2 MG/DL (ref 7–18)
CALCIUM SERPL-MCNC: 9 MG/DL (ref 8.5–10.1)
CHLORIDE SERPL-SCNC: 105 MMOL/L (ref 98–107)
CO2 SERPL-SCNC: 27 MMOL/L (ref 21–32)
COLOR UR: YELLOW
CREAT SERPL-MCNC: 0.9 MG/DL (ref 0.55–1.3)
DEPRECATED RDW RBC AUTO: 14.6 % (ref 11.9–15.9)
EOSINOPHIL # BLD: 4.5 % (ref 0–4.5)
GLUCOSE SERPL-MCNC: 139 MG/DL (ref 74–106)
HCT VFR BLD CALC: 42.5 % (ref 35.4–49)
HGB BLD-MCNC: 14.6 GM/DL (ref 11.7–16.9)
KETONES UR QL STRIP: (no result)
LEUKOCYTE ESTERASE UR QL STRIP.AUTO: NEGATIVE
LYMPHOCYTES # BLD: 38.5 % (ref 8–40)
MAGNESIUM SERPL-MCNC: 2.3 MG/DL (ref 1.8–2.4)
MCH RBC QN AUTO: 31.2 PG (ref 25.7–33.7)
MCHC RBC AUTO-ENTMCNC: 34.4 G/DL (ref 32–35.9)
MCV RBC: 90.7 FL (ref 80–96)
MONOCYTES # BLD AUTO: 12.6 % (ref 3.8–10.2)
NEUTROPHILS # BLD: 42.9 % (ref 42.8–82.8)
NITRITE UR QL STRIP: NEGATIVE
PH UR: 6 [PH] (ref 5–8)
PLATELET # BLD AUTO: 275 K/MM3 (ref 134–434)
PMV BLD: 8.5 FL (ref 7.5–11.1)
POTASSIUM SERPLBLD-SCNC: 4.2 MMOL/L (ref 3.5–5.1)
PROT SERPL-MCNC: 7.5 G/DL (ref 6.4–8.2)
PROT UR QL STRIP: NEGATIVE
PROT UR QL STRIP: NEGATIVE
RBC # BLD AUTO: 4.69 M/MM3 (ref 4–5.6)
SODIUM SERPL-SCNC: 139 MMOL/L (ref 136–145)
SP GR UR: 1.02 (ref 1.01–1.03)
UROBILINOGEN UR STRIP-MCNC: 1 MG/DL (ref 0.2–1)
WBC # BLD AUTO: 4.9 K/MM3 (ref 4–10)

## 2020-07-08 ENCOUNTER — HOSPITAL ENCOUNTER (OUTPATIENT)
Dept: HOSPITAL 74 - JONCCHEMO | Age: 45
Discharge: HOME | End: 2020-07-08
Attending: INTERNAL MEDICINE
Payer: COMMERCIAL

## 2020-07-08 VITALS — HEART RATE: 63 BPM | DIASTOLIC BLOOD PRESSURE: 95 MMHG | SYSTOLIC BLOOD PRESSURE: 140 MMHG | TEMPERATURE: 98.8 F

## 2020-07-08 DIAGNOSIS — Z51.11: Primary | ICD-10-CM

## 2020-07-08 DIAGNOSIS — C18.9: ICD-10-CM

## 2020-07-08 LAB
ALBUMIN SERPL-MCNC: 3.7 G/DL (ref 3.4–5)
ALP SERPL-CCNC: 57 U/L (ref 45–117)
ALT SERPL-CCNC: 189 U/L (ref 13–61)
ANION GAP SERPL CALC-SCNC: 8 MMOL/L (ref 8–16)
AST SERPL-CCNC: 119 U/L (ref 15–37)
BASOPHILS # BLD: 1.2 % (ref 0–2)
BILIRUB SERPL-MCNC: 0.4 MG/DL (ref 0.2–1)
BUN SERPL-MCNC: 6.4 MG/DL (ref 7–18)
CALCIUM SERPL-MCNC: 9.3 MG/DL (ref 8.5–10.1)
CHLORIDE SERPL-SCNC: 106 MMOL/L (ref 98–107)
CO2 SERPL-SCNC: 27 MMOL/L (ref 21–32)
CREAT SERPL-MCNC: 0.8 MG/DL (ref 0.55–1.3)
DEPRECATED RDW RBC AUTO: 15.3 % (ref 11.9–15.9)
EOSINOPHIL # BLD: 3.8 % (ref 0–4.5)
GLUCOSE SERPL-MCNC: 119 MG/DL (ref 74–106)
HCT VFR BLD CALC: 43.2 % (ref 35.4–49)
HGB BLD-MCNC: 14.5 GM/DL (ref 11.7–16.9)
LYMPHOCYTES # BLD: 32.7 % (ref 8–40)
MCH RBC QN AUTO: 31.3 PG (ref 25.7–33.7)
MCHC RBC AUTO-ENTMCNC: 33.6 G/DL (ref 32–35.9)
MCV RBC: 93.2 FL (ref 80–96)
MONOCYTES # BLD AUTO: 13.8 % (ref 3.8–10.2)
NEUTROPHILS # BLD: 48.5 % (ref 42.8–82.8)
PLATELET # BLD AUTO: 282 K/MM3 (ref 134–434)
PMV BLD: 9.2 FL (ref 7.5–11.1)
POTASSIUM SERPLBLD-SCNC: 4.2 MMOL/L (ref 3.5–5.1)
PROT SERPL-MCNC: 7.6 G/DL (ref 6.4–8.2)
RBC # BLD AUTO: 4.64 M/MM3 (ref 4–5.6)
SODIUM SERPL-SCNC: 141 MMOL/L (ref 136–145)
WBC # BLD AUTO: 7.1 K/MM3 (ref 4–10)

## 2020-08-13 ENCOUNTER — HOSPITAL ENCOUNTER (INPATIENT)
Dept: HOSPITAL 74 - J2C | Age: 45
LOS: 1 days | Discharge: HOME | DRG: 167 | End: 2020-08-14
Attending: SURGERY | Admitting: SURGERY
Payer: COMMERCIAL

## 2020-08-13 VITALS — BODY MASS INDEX: 35.9 KG/M2

## 2020-08-13 DIAGNOSIS — C78.01: Primary | ICD-10-CM

## 2020-08-13 DIAGNOSIS — C18.9: ICD-10-CM

## 2020-08-13 DIAGNOSIS — E11.9: ICD-10-CM

## 2020-08-13 DIAGNOSIS — I10: ICD-10-CM

## 2020-08-13 LAB
ALBUMIN SERPL-MCNC: 3.5 G/DL (ref 3.4–5)
ALP SERPL-CCNC: 53 U/L (ref 45–117)
ALT SERPL-CCNC: 177 U/L (ref 13–61)
ANION GAP SERPL CALC-SCNC: 9 MMOL/L (ref 8–16)
AST SERPL-CCNC: 154 U/L (ref 15–37)
BILIRUB SERPL-MCNC: 0.5 MG/DL (ref 0.2–1)
BUN SERPL-MCNC: 11.2 MG/DL (ref 7–18)
CALCIUM SERPL-MCNC: 9.3 MG/DL (ref 8.5–10.1)
CHLORIDE SERPL-SCNC: 104 MMOL/L (ref 98–107)
CHOLEST SERPL-MCNC: 213 MG/DL (ref 50–200)
CO2 SERPL-SCNC: 26 MMOL/L (ref 21–32)
CREAT SERPL-MCNC: 1 MG/DL (ref 0.55–1.3)
DEPRECATED RDW RBC AUTO: 15.4 % (ref 11.9–15.9)
GLUCOSE SERPL-MCNC: 190 MG/DL (ref 74–106)
HCT VFR BLD CALC: 44.3 % (ref 35.4–49)
HDLC SERPL-MCNC: 26 MG/DL (ref 40–60)
HGB BLD-MCNC: 14.8 GM/DL (ref 11.7–16.9)
LDLC SERPL CALC-MCNC: 146 MG/DL (ref 5–100)
MCH RBC QN AUTO: 31.3 PG (ref 25.7–33.7)
MCHC RBC AUTO-ENTMCNC: 33.5 G/DL (ref 32–35.9)
MCV RBC: 93.6 FL (ref 80–96)
PLATELET # BLD AUTO: 224 K/MM3 (ref 134–434)
PMV BLD: 9.5 FL (ref 7.5–11.1)
POTASSIUM SERPLBLD-SCNC: 4.5 MMOL/L (ref 3.5–5.1)
PROT SERPL-MCNC: 7.5 G/DL (ref 6.4–8.2)
RBC # BLD AUTO: 4.74 M/MM3 (ref 4–5.6)
SODIUM SERPL-SCNC: 139 MMOL/L (ref 136–145)
TRIGL SERPL-MCNC: 168 MG/DL (ref 0–150)
WBC # BLD AUTO: 9.3 K/MM3 (ref 4–10)

## 2020-08-13 PROCEDURE — 0BBC8ZX EXCISION OF RIGHT UPPER LUNG LOBE, VIA NATURAL OR ARTIFICIAL OPENING ENDOSCOPIC, DIAGNOSTIC: ICD-10-PCS | Performed by: SURGERY

## 2020-08-13 PROCEDURE — 0WP9X0Z REMOVAL OF DRAINAGE DEVICE FROM RIGHT PLEURAL CAVITY, EXTERNAL APPROACH: ICD-10-PCS | Performed by: SURGERY

## 2020-08-13 PROCEDURE — 0BJ08ZZ INSPECTION OF TRACHEOBRONCHIAL TREE, VIA NATURAL OR ARTIFICIAL OPENING ENDOSCOPIC: ICD-10-PCS | Performed by: SURGERY

## 2020-08-13 PROCEDURE — 0W9940Z DRAINAGE OF RIGHT PLEURAL CAVITY WITH DRAINAGE DEVICE, PERCUTANEOUS ENDOSCOPIC APPROACH: ICD-10-PCS | Performed by: SURGERY

## 2020-08-13 RX ADMIN — INSULIN ASPART SCH UNITS: 100 INJECTION, SOLUTION INTRAVENOUS; SUBCUTANEOUS at 17:22

## 2020-08-13 RX ADMIN — KETOROLAC TROMETHAMINE SCH: 30 INJECTION INTRAMUSCULAR; INTRAVENOUS at 14:14

## 2020-08-13 RX ADMIN — ACETAMINOPHEN PRN MG: 325 TABLET ORAL at 18:49

## 2020-08-13 RX ADMIN — MUPIROCIN SCH APPLIC: 20 OINTMENT TOPICAL at 21:32

## 2020-08-13 RX ADMIN — CEFAZOLIN SCH MLS/HR: 1 INJECTION, POWDER, FOR SOLUTION INTRAVENOUS at 18:11

## 2020-08-13 RX ADMIN — HEPARIN SODIUM SCH UNIT: 5000 INJECTION, SOLUTION INTRAVENOUS; SUBCUTANEOUS at 21:30

## 2020-08-13 RX ADMIN — KETOROLAC TROMETHAMINE SCH: 30 INJECTION INTRAMUSCULAR; INTRAVENOUS at 21:28

## 2020-08-13 RX ADMIN — IPRATROPIUM BROMIDE SCH: 0.5 SOLUTION RESPIRATORY (INHALATION) at 17:51

## 2020-08-13 RX ADMIN — MUPIROCIN SCH APPLIC: 20 OINTMENT TOPICAL at 14:17

## 2020-08-14 VITALS — SYSTOLIC BLOOD PRESSURE: 140 MMHG | DIASTOLIC BLOOD PRESSURE: 102 MMHG

## 2020-08-14 VITALS — HEART RATE: 60 BPM | TEMPERATURE: 97.8 F

## 2020-08-14 LAB
ALBUMIN SERPL-MCNC: 3.2 G/DL (ref 3.4–5)
ALP SERPL-CCNC: 53 U/L (ref 45–117)
ALT SERPL-CCNC: 136 U/L (ref 13–61)
ANION GAP SERPL CALC-SCNC: 8 MMOL/L (ref 8–16)
AST SERPL-CCNC: 98 U/L (ref 15–37)
BILIRUB SERPL-MCNC: 0.5 MG/DL (ref 0.2–1)
BUN SERPL-MCNC: 12.5 MG/DL (ref 7–18)
CALCIUM SERPL-MCNC: 8.8 MG/DL (ref 8.5–10.1)
CHLORIDE SERPL-SCNC: 106 MMOL/L (ref 98–107)
CO2 SERPL-SCNC: 27 MMOL/L (ref 21–32)
CREAT SERPL-MCNC: 1 MG/DL (ref 0.55–1.3)
DEPRECATED RDW RBC AUTO: 15.4 % (ref 11.9–15.9)
GLUCOSE SERPL-MCNC: 104 MG/DL (ref 74–106)
HCT VFR BLD CALC: 44.6 % (ref 35.4–49)
HGB BLD-MCNC: 15 GM/DL (ref 11.7–16.9)
MAGNESIUM SERPL-MCNC: 2.3 MG/DL (ref 1.8–2.4)
MCH RBC QN AUTO: 31.3 PG (ref 25.7–33.7)
MCHC RBC AUTO-ENTMCNC: 33.6 G/DL (ref 32–35.9)
MCV RBC: 93.1 FL (ref 80–96)
PHOSPHATE SERPL-MCNC: 4 MG/DL (ref 2.5–4.9)
PLATELET # BLD AUTO: 230 K/MM3 (ref 134–434)
PMV BLD: 9.5 FL (ref 7.5–11.1)
POTASSIUM SERPLBLD-SCNC: 4.4 MMOL/L (ref 3.5–5.1)
PROT SERPL-MCNC: 7.1 G/DL (ref 6.4–8.2)
RBC # BLD AUTO: 4.79 M/MM3 (ref 4–5.6)
SODIUM SERPL-SCNC: 141 MMOL/L (ref 136–145)
WBC # BLD AUTO: 12.4 K/MM3 (ref 4–10)

## 2020-08-14 RX ADMIN — HEPARIN SODIUM SCH UNIT: 5000 INJECTION, SOLUTION INTRAVENOUS; SUBCUTANEOUS at 06:10

## 2020-08-14 RX ADMIN — IPRATROPIUM BROMIDE SCH: 0.5 SOLUTION RESPIRATORY (INHALATION) at 07:34

## 2020-08-14 RX ADMIN — INSULIN ASPART SCH: 100 INJECTION, SOLUTION INTRAVENOUS; SUBCUTANEOUS at 07:29

## 2020-08-14 RX ADMIN — KETOROLAC TROMETHAMINE SCH: 30 INJECTION INTRAMUSCULAR; INTRAVENOUS at 12:50

## 2020-08-14 RX ADMIN — ACETAMINOPHEN PRN MG: 325 TABLET ORAL at 09:55

## 2020-08-14 RX ADMIN — KETOROLAC TROMETHAMINE SCH MG: 30 INJECTION INTRAMUSCULAR; INTRAVENOUS at 06:10

## 2020-08-14 RX ADMIN — MUPIROCIN SCH APPLIC: 20 OINTMENT TOPICAL at 11:13

## 2020-08-14 RX ADMIN — KETOROLAC TROMETHAMINE SCH MG: 30 INJECTION INTRAMUSCULAR; INTRAVENOUS at 01:25

## 2020-08-14 RX ADMIN — CEFAZOLIN SCH MLS/HR: 1 INJECTION, POWDER, FOR SOLUTION INTRAVENOUS at 01:26

## 2020-08-14 RX ADMIN — INSULIN ASPART SCH: 100 INJECTION, SOLUTION INTRAVENOUS; SUBCUTANEOUS at 11:12

## 2020-11-10 ENCOUNTER — HOSPITAL ENCOUNTER (OUTPATIENT)
Dept: HOSPITAL 74 - JRADIR | Age: 45
Discharge: HOME | End: 2020-11-10
Attending: INTERNAL MEDICINE
Payer: COMMERCIAL

## 2020-11-10 DIAGNOSIS — Z45.2: Primary | ICD-10-CM

## 2020-11-10 LAB
BASOPHILS # BLD: 1.1 % (ref 0–2)
DEPRECATED RDW RBC AUTO: 15 % (ref 11.9–15.9)
EOSINOPHIL # BLD: 2.9 % (ref 0–4.5)
HCT VFR BLD CALC: 45.2 % (ref 35.4–49)
HGB BLD-MCNC: 15.1 GM/DL (ref 11.7–16.9)
INR BLD: 1.06 (ref 0.83–1.09)
LYMPHOCYTES # BLD: 33.9 % (ref 8–40)
MCH RBC QN AUTO: 30.7 PG (ref 25.7–33.7)
MCHC RBC AUTO-ENTMCNC: 33.4 G/DL (ref 32–35.9)
MCV RBC: 91.8 FL (ref 80–96)
MONOCYTES # BLD AUTO: 16 % (ref 3.8–10.2)
NEUTROPHILS # BLD: 46.1 % (ref 42.8–82.8)
PLATELET # BLD AUTO: 222 K/MM3 (ref 134–434)
PMV BLD: 9 FL (ref 7.5–11.1)
PT PNL PPP: 12.8 SEC (ref 9.7–13)
RBC # BLD AUTO: 4.92 M/MM3 (ref 4–5.6)
WBC # BLD AUTO: 8.1 K/MM3 (ref 4–10)

## 2020-11-10 PROCEDURE — 02PY03Z REMOVAL OF INFUSION DEVICE FROM GREAT VESSEL, OPEN APPROACH: ICD-10-PCS | Performed by: RADIOLOGY

## 2020-11-10 PROCEDURE — 0JPT0WZ REMOVAL OF TOTALLY IMPLANTABLE VASCULAR ACCESS DEVICE FROM TRUNK SUBCUTANEOUS TISSUE AND FASCIA, OPEN APPROACH: ICD-10-PCS | Performed by: RADIOLOGY

## 2022-09-08 ENCOUNTER — HOSPITAL ENCOUNTER (EMERGENCY)
Dept: HOSPITAL 74 - JER | Age: 47
Discharge: HOME | End: 2022-09-08
Payer: COMMERCIAL

## 2022-09-08 VITALS
SYSTOLIC BLOOD PRESSURE: 122 MMHG | RESPIRATION RATE: 19 BRPM | HEART RATE: 76 BPM | TEMPERATURE: 98.6 F | DIASTOLIC BLOOD PRESSURE: 78 MMHG

## 2022-09-08 VITALS — BODY MASS INDEX: 31.8 KG/M2

## 2022-09-08 DIAGNOSIS — K42.9: ICD-10-CM

## 2022-09-08 DIAGNOSIS — R16.0: Primary | ICD-10-CM

## 2022-09-08 LAB
ALBUMIN SERPL-MCNC: 3.8 G/DL (ref 3.4–5)
ALP SERPL-CCNC: 57 U/L (ref 45–117)
ALT SERPL-CCNC: 50 U/L (ref 13–61)
ANION GAP SERPL CALC-SCNC: 5 MMOL/L (ref 8–16)
APPEARANCE UR: CLEAR
AST SERPL-CCNC: 32 U/L (ref 15–37)
BASOPHILS # BLD: 1.2 % (ref 0–2)
BILIRUB SERPL-MCNC: 0.6 MG/DL (ref 0.2–1)
BILIRUB UR STRIP.AUTO-MCNC: NEGATIVE MG/DL
BUN SERPL-MCNC: 10.9 MG/DL (ref 7–18)
CALCIUM SERPL-MCNC: 9.2 MG/DL (ref 8.5–10.1)
CHLORIDE SERPL-SCNC: 108 MMOL/L (ref 98–107)
CO2 SERPL-SCNC: 29 MMOL/L (ref 21–32)
COLOR UR: YELLOW
CREAT SERPL-MCNC: 1.2 MG/DL (ref 0.55–1.3)
DEPRECATED RDW RBC AUTO: 14.4 % (ref 11.9–15.9)
EOSINOPHIL # BLD: 2 % (ref 0–4.5)
GLUCOSE SERPL-MCNC: 82 MG/DL (ref 74–106)
HCT VFR BLD CALC: 45 % (ref 35.4–49)
HGB BLD-MCNC: 15.1 GM/DL (ref 11.7–16.9)
KETONES UR QL STRIP: (no result)
LEUKOCYTE ESTERASE UR QL STRIP.AUTO: NEGATIVE
LIPASE SERPL-CCNC: 311 U/L (ref 73–393)
LYMPHOCYTES # BLD: 43.2 % (ref 8–40)
MCH RBC QN AUTO: 31 PG (ref 25.7–33.7)
MCHC RBC AUTO-ENTMCNC: 33.7 G/DL (ref 32–35.9)
MCV RBC: 91.9 FL (ref 80–96)
MONOCYTES # BLD AUTO: 9.1 % (ref 3.8–10.2)
NEUTROPHILS # BLD: 44.5 % (ref 42.8–82.8)
NITRITE UR QL STRIP: NEGATIVE
PH UR: 6.5 [PH] (ref 5–8)
PLATELET # BLD AUTO: 250 10^3/UL (ref 134–434)
PMV BLD: 9.2 FL (ref 7.5–11.1)
PROT SERPL-MCNC: 7.6 G/DL (ref 6.4–8.2)
PROT UR QL STRIP: (no result)
PROT UR QL STRIP: NEGATIVE
RBC # BLD AUTO: 4.89 M/MM3 (ref 4–5.6)
SODIUM SERPL-SCNC: 142 MMOL/L (ref 136–145)
SP GR UR: 1.03 (ref 1.01–1.03)
UROBILINOGEN UR STRIP-MCNC: 1 MG/DL (ref 0.2–1)
WBC # BLD AUTO: 6.9 K/MM3 (ref 4–10)

## 2022-09-08 PROCEDURE — 3E0337Z INTRODUCTION OF ELECTROLYTIC AND WATER BALANCE SUBSTANCE INTO PERIPHERAL VEIN, PERCUTANEOUS APPROACH: ICD-10-PCS | Performed by: NURSE PRACTITIONER

## 2022-09-08 PROCEDURE — 3E0333Z INTRODUCTION OF ANTI-INFLAMMATORY INTO PERIPHERAL VEIN, PERCUTANEOUS APPROACH: ICD-10-PCS | Performed by: NURSE PRACTITIONER

## 2022-09-08 PROCEDURE — 3E033GC INTRODUCTION OF OTHER THERAPEUTIC SUBSTANCE INTO PERIPHERAL VEIN, PERCUTANEOUS APPROACH: ICD-10-PCS | Performed by: NURSE PRACTITIONER

## 2022-10-18 ENCOUNTER — HOSPITAL ENCOUNTER (OUTPATIENT)
Dept: HOSPITAL 74 - JASU-SURG | Age: 47
Discharge: HOME | End: 2022-10-18
Attending: SURGERY
Payer: COMMERCIAL

## 2022-10-18 VITALS — TEMPERATURE: 97 F | DIASTOLIC BLOOD PRESSURE: 83 MMHG | HEART RATE: 58 BPM | SYSTOLIC BLOOD PRESSURE: 140 MMHG

## 2022-10-18 VITALS — BODY MASS INDEX: 32.5 KG/M2

## 2022-10-18 VITALS — RESPIRATION RATE: 18 BRPM

## 2022-10-18 DIAGNOSIS — K43.2: Primary | ICD-10-CM

## 2022-10-18 PROCEDURE — 0WQF0ZZ REPAIR ABDOMINAL WALL, OPEN APPROACH: ICD-10-PCS | Performed by: SURGERY

## 2023-01-31 PROBLEM — Z00.00 ENCOUNTER FOR PREVENTIVE HEALTH EXAMINATION: Status: ACTIVE | Noted: 2023-01-31

## 2023-02-27 ENCOUNTER — APPOINTMENT (OUTPATIENT)
Dept: HEMATOLOGY ONCOLOGY | Facility: CLINIC | Age: 48
End: 2023-02-27
Payer: MEDICAID

## 2023-03-24 DIAGNOSIS — Z86.39 PERSONAL HISTORY OF OTHER ENDOCRINE, NUTRITIONAL AND METABOLIC DISEASE: ICD-10-CM

## 2023-03-24 DIAGNOSIS — Z80.9 FAMILY HISTORY OF MALIGNANT NEOPLASM, UNSPECIFIED: ICD-10-CM

## 2023-03-24 DIAGNOSIS — Z12.11 ENCOUNTER FOR SCREENING FOR MALIGNANT NEOPLASM OF COLON: ICD-10-CM

## 2023-03-24 DIAGNOSIS — Z87.19 PERSONAL HISTORY OF OTHER DISEASES OF THE DIGESTIVE SYSTEM: ICD-10-CM

## 2023-03-24 DIAGNOSIS — Z86.79 PERSONAL HISTORY OF OTHER DISEASES OF THE CIRCULATORY SYSTEM: ICD-10-CM

## 2023-03-24 RX ORDER — ATENOLOL 50 MG/1
50 TABLET ORAL
Refills: 0 | Status: ACTIVE | COMMUNITY

## 2023-03-24 RX ORDER — METFORMIN HYDROCHLORIDE 500 MG/1
500 TABLET, COATED ORAL
Refills: 0 | Status: ACTIVE | COMMUNITY

## 2023-03-27 ENCOUNTER — APPOINTMENT (OUTPATIENT)
Dept: HEMATOLOGY ONCOLOGY | Facility: CLINIC | Age: 48
End: 2023-03-27
Payer: MEDICAID

## 2023-03-27 VITALS
RESPIRATION RATE: 18 BRPM | OXYGEN SATURATION: 95 % | TEMPERATURE: 98.4 F | WEIGHT: 243 LBS | HEIGHT: 72 IN | HEART RATE: 79 BPM | SYSTOLIC BLOOD PRESSURE: 137 MMHG | BODY MASS INDEX: 32.91 KG/M2 | DIASTOLIC BLOOD PRESSURE: 89 MMHG

## 2023-03-27 DIAGNOSIS — C18.9 MALIGNANT NEOPLASM OF COLON, UNSPECIFIED: ICD-10-CM

## 2023-03-27 PROCEDURE — 36415 COLL VENOUS BLD VENIPUNCTURE: CPT

## 2023-03-27 PROCEDURE — 99203 OFFICE O/P NEW LOW 30 MIN: CPT | Mod: 25

## 2023-03-28 ENCOUNTER — NON-APPOINTMENT (OUTPATIENT)
Age: 48
End: 2023-03-28

## 2023-03-28 LAB
ALBUMIN SERPL ELPH-MCNC: 4.2 G/DL
ALP BLD-CCNC: 62 U/L
ALT SERPL-CCNC: 25 U/L
ANION GAP SERPL CALC-SCNC: 16 MMOL/L
AST SERPL-CCNC: 23 U/L
BASOPHILS # BLD AUTO: 0.05 K/UL
BASOPHILS NFR BLD AUTO: 0.7 %
BILIRUB SERPL-MCNC: 0.4 MG/DL
BUN SERPL-MCNC: 13 MG/DL
CALCIUM SERPL-MCNC: 9.4 MG/DL
CEA SERPL-MCNC: 2.7 NG/ML
CHLORIDE SERPL-SCNC: 103 MMOL/L
CO2 SERPL-SCNC: 26 MMOL/L
CREAT SERPL-MCNC: 1.05 MG/DL
EGFR: 87 ML/MIN/1.73M2
EOSINOPHIL # BLD AUTO: 0.19 K/UL
EOSINOPHIL NFR BLD AUTO: 2.6 %
GLUCOSE SERPL-MCNC: 31 MG/DL
HCT VFR BLD CALC: 46.5 %
HGB BLD-MCNC: 15.5 G/DL
IMM GRANULOCYTES NFR BLD AUTO: 0.1 %
LYMPHOCYTES # BLD AUTO: 2.75 K/UL
LYMPHOCYTES NFR BLD AUTO: 37.3 %
MAN DIFF?: NORMAL
MCHC RBC-ENTMCNC: 31.6 PG
MCHC RBC-ENTMCNC: 33.3 GM/DL
MCV RBC AUTO: 94.7 FL
MONOCYTES # BLD AUTO: 0.86 K/UL
MONOCYTES NFR BLD AUTO: 11.7 %
NEUTROPHILS # BLD AUTO: 3.52 K/UL
NEUTROPHILS NFR BLD AUTO: 47.6 %
PLATELET # BLD AUTO: 285 K/UL
POTASSIUM SERPL-SCNC: 5.7 MMOL/L
PROT SERPL-MCNC: 7.2 G/DL
RBC # BLD: 4.91 M/UL
RBC # FLD: 13.6 %
SODIUM SERPL-SCNC: 146 MMOL/L
WBC # FLD AUTO: 7.38 K/UL

## 2023-04-03 NOTE — HISTORY OF PRESENT ILLNESS
[de-identified] : Mr. Henao is a 47 year old male with PMH of DM, HTN that presents for initial consultation transferring care from [facility] for the management of metastatic colon cancer, now on surveillance with interval imaging. \par \par Oncological History: \par \par Patient presented in 8/2018 for presumed perforated diverticulitis at the level of splenic flexure with fever and leukocytosis. CT demonstrated a 3.6cm abscess at the level of splenic flexure. He opted for medical management. He is s/p CNY 9/2018 revealing a circumferential near obstructing mass in the distal descending proximal sigmoid colon. CT CAP 9/2018 revealed resolution of pericolonic inflammatory changes at the level of splenic flexure. Several unchanged mildly enlarged pericolonic mesenteric nodes. A nonspecific 2.3cm R hepatic lobe hypodensity lesion is noted posteriorly. A nonspecific 0.3cm RUL nodule. He is s/p hemicolectomy and umbilical hernia repeat on 9/21/18 pathology consistent with mucinous adenocarcinoma grade 2 mod differentiated invading through muscularis propria into pericolic adipose tissue. No perf identified all margins negative 6/20LNs positive for metastatic adenocarcinoma +LVI +PNI pT3N2a No loss of nuclear expression of MMR proteins. NGS on original bx showed KRAS mutation PDL1<1% ALLISON \par \par 2/15/19 - 5/31/19 started CAPOX  x 6 cycles\par \par 1/2019 PET/CT revealing no PET evidence of metastatic disease. 1.6cm level 2 lymph node demonstrating hypermetabolic activity. Diffuse thickening of the nasopharyngeal sot tissues with narrowing of the airway. Prominent nasopharyngeal LNs. This could presents lymphatic hyperplasia. Infectious or neoplastic. Direct visualization is recommended \par \par 4/29/2020 started Irinotecan/Avastin--- ERIN mucinous adenocarcinoma consistent with colon primary \par \par 6/2020 PET/CT revealing minimally avid 1.9cm pulmonary nodule in the RUL favored to be of benign etiology. However given the technical limitation of the study close surveillance or sampling recommended. No other evidence of metastatic disease. Post surgical changes in the descending colon. Evaluation of the colon is menezes\par ited by recent metformin use. \par \par 8/2021 RUL wedge resection negative ERIN wedge resection mucinous adenocarcinoma compatible with colon primary \par  \par 9/2021 CT CAP revealing s/p R hemicolectomy with no evidence of recurrent malignancy or acute pathology within the chest. Hepatomegaly with diffuse fatty infiltrate of the liver. There are stable hypodensities within the R lobe since 4/2021. s/p L colon resection with no evidence of recurrent malignancy or acute pathology within the abd or pelvis \par \par \par \par \par \par \par \par

## 2023-04-03 NOTE — HISTORY OF PRESENT ILLNESS
[de-identified] : Mr. Henao is a 47 year old male with PMH of DM, HTN that presents for initial consultation transferring care from [facility] for the management of metastatic colon cancer, now on surveillance with interval imaging. \par \par Oncological History: \par \par Patient presented in 8/2018 for presumed perforated diverticulitis at the level of splenic flexure with fever and leukocytosis. CT demonstrated a 3.6cm abscess at the level of splenic flexure. He opted for medical management. He is s/p CNY 9/2018 revealing a circumferential near obstructing mass in the distal descending proximal sigmoid colon. CT CAP 9/2018 revealed resolution of pericolonic inflammatory changes at the level of splenic flexure. Several unchanged mildly enlarged pericolonic mesenteric nodes. A nonspecific 2.3cm R hepatic lobe hypodensity lesion is noted posteriorly. A nonspecific 0.3cm RUL nodule. He is s/p hemicolectomy and umbilical hernia repeat on 9/21/18 pathology consistent with mucinous adenocarcinoma grade 2 mod differentiated invading through muscularis propria into pericolic adipose tissue. No perf identified all margins negative 6/20LNs positive for metastatic adenocarcinoma +LVI +PNI pT3N2a No loss of nuclear expression of MMR proteins. NGS on original bx showed KRAS mutation PDL1<1% ALLISON \par \par 2/15/19 - 5/31/19 started CAPOX  x 6 cycles\par \par 1/2019 PET/CT revealing no PET evidence of metastatic disease. 1.6cm level 2 lymph node demonstrating hypermetabolic activity. Diffuse thickening of the nasopharyngeal sot tissues with narrowing of the airway. Prominent nasopharyngeal LNs. This could presents lymphatic hyperplasia. Infectious or neoplastic. Direct visualization is recommended \par \par 4/29/2020 started Irinotecan/Avastin--- ERIN mucinous adenocarcinoma consistent with colon primary \par \par 6/2020 PET/CT revealing minimally avid 1.9cm pulmonary nodule in the RUL favored to be of benign etiology. However given the technical limitation of the study close surveillance or sampling recommended. No other evidence of metastatic disease. Post surgical changes in the descending colon. Evaluation of the colon is menezes\par ited by recent metformin use. \par \par 8/2021 RUL wedge resection negative ERIN wedge resection mucinous adenocarcinoma compatible with colon primary \par  \par 9/2021 CT CAP revealing s/p R hemicolectomy with no evidence of recurrent malignancy or acute pathology within the chest. Hepatomegaly with diffuse fatty infiltrate of the liver. There are stable hypodensities within the R lobe since 4/2021. s/p L colon resection with no evidence of recurrent malignancy or acute pathology within the abd or pelvis \par \par \par \par \par \par \par \par

## 2023-04-03 NOTE — ASSESSMENT
[FreeTextEntry1] : [facility] for the management of metastatic colon cancer, now on surveillance with interval imaging. \par \par Oncological History: \par \par Patient presented in 8/2018 for presumed perforated diverticulitis at the level of splenic flexure with fever and leukocytosis. CT demonstrated a 3.6cm abscess at the level of splenic flexure. He opted for medical management. He is s/p CNY 9/2018 revealing a circumferential near obstructing mass in the distal descending proximal sigmoid colon. CT CAP 9/2018 revealed resolution of pericolonic inflammatory changes at the level of splenic flexure. Several unchanged mildly enlarged pericolonic mesenteric nodes. A nonspecific 2.3cm R hepatic lobe hypodensity lesion is noted posteriorly. A nonspecific 0.3cm RUL nodule. He is s/p hemicolectomy and umbilical hernia repeat on 9/21/18 pathology consistent with mucinous adenocarcinoma grade 2 mod differentiated invading through muscularis propria into pericolic adipose tissue. No perf identified all margins negative 6/20LNs positive for metastatic adenocarcinoma +LVI +PNI pT3N2a No loss of nuclear expression of MMR proteins. NGS on original bx showed KRAS mutation PDL1<1% ALLISON \par \par 2/15/19 - 5/31/19 started CAPOX  x 6 cycles\par \par 1/2019 PET/CT revealing no PET evidence of metastatic disease. 1.6cm level 2 lymph node demonstrating hypermetabolic activity. Diffuse thickening of the nasopharyngeal sot tissues with narrowing of the airway. Prominent nasopharyngeal LNs. This could presents lymphatic hyperplasia. Infectious or neoplastic. Direct visualization is recommended \par \par 4/29/2020 started Irinotecan/Avastin--- ERIN mucinous adenocarcinoma consistent with colon primary \par \par 6/2020 PET/CT revealing minimally avid 1.9cm pulmonary nodule in the RUL favored to be of benign etiology. However given the technical limitation of the study close surveillance or sampling recommended. No other evidence of metastatic disease. Post surgical changes in the descending colon. Evaluation of the colon is menezes\par ited by recent metformin use. \par \par 8/2021 RUL wedge resection negative ERIN wedge resection mucinous adenocarcinoma compatible with colon primary \par  \par 9/2021 CT CAP revealing s/p R hemicolectomy with no evidence of recurrent malignancy or acute pathology within the chest. Hepatomegaly with diffuse fatty infiltrate of the liver. There are stable hypodensities within the R lobe since 4/2021. s/p L colon resection with no evidence of recurrent malignancy or acute pathology within the abd or pelvis \par \par Plan:\par \par check CEA\par repeat imaging\par \par f/u 1 month\par \par \par

## 2023-04-03 NOTE — ASSESSMENT
[FreeTextEntry1] : [facility] for the management of metastatic colon cancer, now on surveillance with interval imaging. \par \par Oncological History: \par \par Patient presented in 8/2018 for presumed perforated diverticulitis at the level of splenic flexure with fever and leukocytosis. CT demonstrated a 3.6cm abscess at the level of splenic flexure. He opted for medical management. He is s/p CNY 9/2018 revealing a circumferential near obstructing mass in the distal descending proximal sigmoid colon. CT CAP 9/2018 revealed resolution of pericolonic inflammatory changes at the level of splenic flexure. Several unchanged mildly enlarged pericolonic mesenteric nodes. A nonspecific 2.3cm R hepatic lobe hypodensity lesion is noted posteriorly. A nonspecific 0.3cm RUL nodule. He is s/p hemicolectomy and umbilical hernia repeat on 9/21/18 pathology consistent with mucinous adenocarcinoma grade 2 mod differentiated invading through muscularis propria into pericolic adipose tissue. No perf identified all margins negative 6/20LNs positive for metastatic adenocarcinoma +LVI +PNI pT3N2a No loss of nuclear expression of MMR proteins. NGS on original bx showed KRAS mutation PDL1<1% ALLISON \par \par 2/15/19 - 5/31/19 started CAPOX  x 6 cycles\par \par 1/2019 PET/CT revealing no PET evidence of metastatic disease. 1.6cm level 2 lymph node demonstrating hypermetabolic activity. Diffuse thickening of the nasopharyngeal sot tissues with narrowing of the airway. Prominent nasopharyngeal LNs. This could presents lymphatic hyperplasia. Infectious or neoplastic. Direct visualization is recommended \par \par 4/29/2020 started Irinotecan/Avastin--- ERIN mucinous adenocarcinoma consistent with colon primary \par \par 6/2020 PET/CT revealing minimally avid 1.9cm pulmonary nodule in the RUL favored to be of benign etiology. However given the technical limitation of the study close surveillance or sampling recommended. No other evidence of metastatic disease. Post surgical changes in the descending colon. Evaluation of the colon is menezes\par ited by recent metformin use. \par \par 8/2021 RUL wedge resection negative ERIN wedge resection mucinous adenocarcinoma compatible with colon primary \par  \par 9/2021 CT CAP revealing s/p R hemicolectomy with no evidence of recurrent malignancy or acute pathology within the chest. Hepatomegaly with diffuse fatty infiltrate of the liver. There are stable hypodensities within the R lobe since 4/2021. s/p L colon resection with no evidence of recurrent malignancy or acute pathology within the abd or pelvis \par \par Plan:\par \par check CEA\par repeat imaging\par \par f/u 1 month\par \par \par

## 2023-04-24 ENCOUNTER — APPOINTMENT (OUTPATIENT)
Dept: HEMATOLOGY ONCOLOGY | Facility: CLINIC | Age: 48
End: 2023-04-24

## 2023-06-20 ENCOUNTER — HOSPITAL ENCOUNTER (EMERGENCY)
Dept: HOSPITAL 74 - JER | Age: 48
Discharge: LEFT BEFORE DISCHARGE | End: 2023-06-20
Payer: COMMERCIAL

## 2023-06-20 VITALS
RESPIRATION RATE: 18 BRPM | SYSTOLIC BLOOD PRESSURE: 135 MMHG | DIASTOLIC BLOOD PRESSURE: 94 MMHG | HEART RATE: 71 BPM | TEMPERATURE: 98.5 F

## 2023-06-20 VITALS — BODY MASS INDEX: 31.8 KG/M2

## 2023-06-20 DIAGNOSIS — K85.90: ICD-10-CM

## 2023-06-20 DIAGNOSIS — R10.11: Primary | ICD-10-CM

## 2023-06-20 LAB
ALBUMIN SERPL-MCNC: 3.6 G/DL (ref 3.4–5)
ALP SERPL-CCNC: 64 U/L (ref 45–117)
ALT SERPL-CCNC: 43 U/L (ref 13–61)
ANION GAP SERPL CALC-SCNC: 5 MMOL/L (ref 8–16)
AST SERPL-CCNC: 27 U/L (ref 15–37)
BASOPHILS # BLD: 1.1 % (ref 0–2)
BILIRUB SERPL-MCNC: 0.4 MG/DL (ref 0.2–1)
BUN SERPL-MCNC: 14.8 MG/DL (ref 7–18)
CALCIUM SERPL-MCNC: 9.1 MG/DL (ref 8.5–10.1)
CHLORIDE SERPL-SCNC: 108 MMOL/L (ref 98–107)
CO2 SERPL-SCNC: 28 MMOL/L (ref 21–32)
CREAT SERPL-MCNC: 1 MG/DL (ref 0.55–1.3)
DEPRECATED RDW RBC AUTO: 13.8 % (ref 11.9–15.9)
EOSINOPHIL # BLD: 6.6 % (ref 0–4.5)
GLUCOSE SERPL-MCNC: 75 MG/DL (ref 74–106)
HCT VFR BLD CALC: 47.5 % (ref 35.4–49)
HGB BLD-MCNC: 16.4 GM/DL (ref 11.7–16.9)
LYMPHOCYTES # BLD: 34.9 % (ref 8–40)
MCH RBC QN AUTO: 31 PG (ref 25.7–33.7)
MCHC RBC AUTO-ENTMCNC: 34.5 G/DL (ref 32–35.9)
MCV RBC: 89.9 FL (ref 80–96)
MONOCYTES # BLD AUTO: 15.6 % (ref 3.8–10.2)
NEUTROPHILS # BLD: 41.8 % (ref 42.8–82.8)
PLATELET # BLD AUTO: 249 10^3/UL (ref 134–434)
PMV BLD: 8.3 FL (ref 7.5–11.1)
POTASSIUM SERPLBLD-SCNC: 4.2 MMOL/L (ref 3.5–5.1)
PROT SERPL-MCNC: 7.6 G/DL (ref 6.4–8.2)
RBC # BLD AUTO: 5.28 M/MM3 (ref 4–5.6)
SODIUM SERPL-SCNC: 142 MMOL/L (ref 136–145)
WBC # BLD AUTO: 4.6 K/MM3 (ref 4–10)

## 2023-06-20 PROCEDURE — 3E033NZ INTRODUCTION OF ANALGESICS, HYPNOTICS, SEDATIVES INTO PERIPHERAL VEIN, PERCUTANEOUS APPROACH: ICD-10-PCS

## 2024-03-26 ENCOUNTER — HOSPITAL ENCOUNTER (EMERGENCY)
Dept: HOSPITAL 74 - JER | Age: 49
Discharge: HOME | End: 2024-03-26
Payer: COMMERCIAL

## 2024-03-26 VITALS — RESPIRATION RATE: 18 BRPM

## 2024-03-26 VITALS — DIASTOLIC BLOOD PRESSURE: 112 MMHG | HEART RATE: 53 BPM | SYSTOLIC BLOOD PRESSURE: 172 MMHG | TEMPERATURE: 98 F

## 2024-03-26 VITALS — BODY MASS INDEX: 31.9 KG/M2

## 2024-03-26 DIAGNOSIS — R61: ICD-10-CM

## 2024-03-26 DIAGNOSIS — Z20.822: ICD-10-CM

## 2024-03-26 DIAGNOSIS — R11.2: ICD-10-CM

## 2024-03-26 DIAGNOSIS — R10.84: Primary | ICD-10-CM

## 2024-03-26 DIAGNOSIS — R07.9: ICD-10-CM

## 2024-03-26 DIAGNOSIS — R19.7: ICD-10-CM

## 2024-03-26 LAB
ALBUMIN SERPL-MCNC: 4 G/DL (ref 3.4–5)
ALP SERPL-CCNC: 68 U/L (ref 45–117)
ALT SERPL-CCNC: 49 U/L (ref 13–61)
ANION GAP SERPL CALC-SCNC: 8 MMOL/L (ref 4–13)
APPEARANCE UR: CLEAR
APTT BLD: 29 SECONDS (ref 25.2–36.5)
AST SERPL-CCNC: 33 U/L (ref 15–37)
BASOPHILS # BLD: 0.7 % (ref 0–2)
BILIRUB SERPL-MCNC: 0.9 MG/DL (ref 0.2–1)
BILIRUB UR STRIP.AUTO-MCNC: NEGATIVE MG/DL
BUN SERPL-MCNC: 13.8 MG/DL (ref 7–18)
CALCIUM SERPL-MCNC: 9.9 MG/DL (ref 8.5–10.1)
CHLORIDE SERPL-SCNC: 107 MMOL/L (ref 98–107)
CO2 SERPL-SCNC: 28 MMOL/L (ref 21–32)
COLOR UR: YELLOW
CREAT SERPL-MCNC: 1.4 MG/DL (ref 0.55–1.3)
DEPRECATED RDW RBC AUTO: 14.1 % (ref 11.9–15.9)
EOSINOPHIL # BLD: 1.6 % (ref 0–4.5)
GLUCOSE SERPL-MCNC: 129 MG/DL (ref 74–106)
HCT VFR BLD CALC: 48.4 % (ref 35.4–49)
HGB BLD-MCNC: 16.8 GM/DL (ref 11.7–16.9)
INR BLD: 1 (ref 0.83–1.09)
KETONES UR QL STRIP: NEGATIVE
LEUKOCYTE ESTERASE UR QL STRIP.AUTO: NEGATIVE
LYMPHOCYTES # BLD: 28.2 % (ref 8–40)
MAGNESIUM SERPL-MCNC: 2 MG/DL (ref 1.8–2.4)
MCH RBC QN AUTO: 31.6 PG (ref 25.7–33.7)
MCHC RBC AUTO-ENTMCNC: 34.7 G/DL (ref 32–35.9)
MCV RBC: 91.1 FL (ref 80–96)
MONOCYTES # BLD AUTO: 9.3 % (ref 3.8–10.2)
NEUTROPHILS # BLD: 60.2 % (ref 42.8–82.8)
NITRITE UR QL STRIP: NEGATIVE
PH UR: 8.5 [PH] (ref 5–8)
PLATELET # BLD AUTO: 255 10^3/UL (ref 134–434)
PMV BLD: 9.3 FL (ref 7.5–11.1)
POTASSIUM SERPLBLD-SCNC: 4.4 MMOL/L (ref 3.5–5.1)
PROT SERPL-MCNC: 8.5 G/DL (ref 6.4–8.2)
PROT UR QL STRIP: NEGATIVE
PROT UR QL STRIP: NEGATIVE
PT PNL PPP: 11.6 SEC (ref 9.7–13)
RBC # BLD AUTO: 5.31 M/MM3 (ref 4–5.6)
SODIUM SERPL-SCNC: 143 MMOL/L (ref 136–145)
SP GR UR: 1.01 (ref 1.01–1.03)
UROBILINOGEN UR STRIP-MCNC: 0.2 MG/DL (ref 0.2–1)
WBC # BLD AUTO: 8.6 K/MM3 (ref 4–10)

## 2024-03-26 PROCEDURE — 3E033NZ INTRODUCTION OF ANALGESICS, HYPNOTICS, SEDATIVES INTO PERIPHERAL VEIN, PERCUTANEOUS APPROACH: ICD-10-PCS

## 2024-03-26 PROCEDURE — 3E033GC INTRODUCTION OF OTHER THERAPEUTIC SUBSTANCE INTO PERIPHERAL VEIN, PERCUTANEOUS APPROACH: ICD-10-PCS

## 2024-03-26 RX ADMIN — AMLODIPINE BESYLATE ONE MG: 5 TABLET ORAL at 13:45

## 2024-03-26 RX ADMIN — ACETAMINOPHEN ONE MG: 10 INJECTION, SOLUTION INTRAVENOUS at 08:17

## 2024-03-26 RX ADMIN — FAMOTIDINE ONE MLS/HR: 20 INJECTION, SOLUTION INTRAVENOUS at 08:25

## 2024-03-26 RX ADMIN — SODIUM CHLORIDE, POTASSIUM CHLORIDE, SODIUM LACTATE AND CALCIUM CHLORIDE ONE ML: 600; 310; 30; 20 INJECTION, SOLUTION INTRAVENOUS at 07:40

## 2024-03-26 RX ADMIN — ONDANSETRON ONE MG: 2 INJECTION INTRAMUSCULAR; INTRAVENOUS at 07:40

## 2024-03-26 RX ADMIN — HALOPERIDOL LACTATE ONE: 5 INJECTION, SOLUTION INTRAMUSCULAR at 13:02

## 2024-03-26 RX ADMIN — MORPHINE SULFATE ONE MG: 2 INJECTION, SOLUTION INTRAMUSCULAR; INTRAVENOUS at 07:40

## 2024-03-26 RX ADMIN — ALUMINUM HYDROXIDE, MAGNESIUM HYDROXIDE, AND SIMETHICONE ONE ML: 200; 200; 20 SUSPENSION ORAL at 10:30

## 2024-03-26 RX ADMIN — SODIUM CHLORIDE, POTASSIUM CHLORIDE, SODIUM LACTATE AND CALCIUM CHLORIDE ONE ML: 600; 310; 30; 20 INJECTION, SOLUTION INTRAVENOUS at 10:30
